# Patient Record
Sex: MALE | Race: BLACK OR AFRICAN AMERICAN | Employment: UNEMPLOYED | URBAN - METROPOLITAN AREA
[De-identification: names, ages, dates, MRNs, and addresses within clinical notes are randomized per-mention and may not be internally consistent; named-entity substitution may affect disease eponyms.]

---

## 2020-11-17 PROCEDURE — 99284 EMERGENCY DEPT VISIT MOD MDM: CPT

## 2020-11-17 PROCEDURE — 99283 EMERGENCY DEPT VISIT LOW MDM: CPT

## 2020-11-18 ENCOUNTER — HOSPITAL ENCOUNTER (OUTPATIENT)
Dept: GENERAL RADIOLOGY | Age: 50
Discharge: HOME | End: 2020-11-18
Attending: PHYSICIAN ASSISTANT

## 2020-11-18 ENCOUNTER — HOSPITAL ENCOUNTER (EMERGENCY)
Age: 50
Discharge: HOME | End: 2020-11-18
Attending: EMERGENCY MEDICINE

## 2020-11-18 VITALS
OXYGEN SATURATION: 97 % | HEART RATE: 104 BPM | SYSTOLIC BLOOD PRESSURE: 161 MMHG | RESPIRATION RATE: 18 BRPM | DIASTOLIC BLOOD PRESSURE: 98 MMHG | TEMPERATURE: 98.5 F

## 2020-11-18 DIAGNOSIS — Z20.822 SUSPECTED COVID-19 VIRUS INFECTION: Primary | ICD-10-CM

## 2020-11-18 DIAGNOSIS — R05.9 COUGH: ICD-10-CM

## 2020-11-18 LAB
APPEARANCE UR: CLEAR
BILIRUB UR QL: NEGATIVE
COLOR UR: YELLOW
GLUCOSE UR STRIP.AUTO-MCNC: NEGATIVE MG/DL
HGB UR QL STRIP: ABNORMAL
KETONES UR QL STRIP.AUTO: NEGATIVE MG/DL
LEUKOCYTE ESTERASE UR QL STRIP.AUTO: NEGATIVE
NITRITE UR QL STRIP.AUTO: NEGATIVE
PH UR STRIP: 6 [PH] (ref 5–8)
PROT UR STRIP-MCNC: NEGATIVE MG/DL
RBC #/AREA URNS HPF: NORMAL /HPF (ref 0–5)
SP GR UR REFRACTOMETRY: 1.02 (ref 1–1.03)
UROBILINOGEN UR QL STRIP.AUTO: 0.2 EU/DL (ref 0.2–1)
WBC URNS QL MICRO: NORMAL /HPF (ref 0–5)

## 2020-11-18 PROCEDURE — 81001 URINALYSIS AUTO W/SCOPE: CPT

## 2020-11-18 PROCEDURE — 87086 URINE CULTURE/COLONY COUNT: CPT

## 2020-11-18 PROCEDURE — 87635 SARS-COV-2 COVID-19 AMP PRB: CPT

## 2020-11-18 PROCEDURE — 71045 X-RAY EXAM CHEST 1 VIEW: CPT

## 2020-11-18 RX ORDER — ALBUTEROL SULFATE 90 UG/1
2 AEROSOL, METERED RESPIRATORY (INHALATION)
Qty: 1 INHALER | Refills: 0 | Status: SHIPPED | OUTPATIENT
Start: 2020-11-18 | End: 2021-04-09

## 2020-11-18 RX ORDER — IBUPROFEN 800 MG/1
800 TABLET ORAL
Qty: 20 TAB | Refills: 0 | Status: SHIPPED | OUTPATIENT
Start: 2020-11-18 | End: 2020-11-25

## 2020-11-18 RX ORDER — AZITHROMYCIN 250 MG/1
TABLET, FILM COATED ORAL
Qty: 6 TAB | Refills: 0 | Status: SHIPPED | OUTPATIENT
Start: 2020-11-18 | End: 2021-04-09

## 2020-11-18 RX ORDER — BENZONATATE 100 MG/1
100 CAPSULE ORAL
Qty: 30 CAP | Refills: 0 | Status: SHIPPED | OUTPATIENT
Start: 2020-11-18 | End: 2020-11-25

## 2020-11-18 NOTE — LETTER
NOTIFICATION OF RETURN TO WORK / SCHOOL 
 
11/18/2020 Mr. Nikhil Garcia [facility] [id number] To Whom It May Concern: Nikhil Garcia was seen in the ED on 11/18/2020 and may be excused from work for 2 weeks, unless his COVID test results negative and he is asymptomatic. Sincerely, Anika Mart PA-C

## 2020-11-18 NOTE — ED NOTES
I have reviewed discharge instructions with the patient. The patient verbalized understanding. No further questions.

## 2020-11-18 NOTE — ED PROVIDER NOTES
EMERGENCY DEPARTMENT HISTORY AND PHYSICAL EXAM    Date: 11/18/2020  Patient Name: Jennifer Cabrera    History of Presenting Illness     Chief Complaint   Patient presents with    Generalized Body Aches    Back Pain    Headache         History Provided By: patient     Chief Complaint: SOB, cough, loss of taste   Duration: 3 days  Timing:  acute  Location: chest   Quality n/a  Severity: moderate  Modifying Factors: none   Associated Symptoms: cough, headaches, body aches, back pain, SOB, loss of taste       Additional History (Context): Jennifer Cabrera is a 52 y.o. male with no documented PMH who presents with c/o 3 days of cough, SOB, body aches, back aches, headaches, and loss of taste. Denies any known COVID exposures but does express concerns as he went to a bar last week. No other complaints reported at this time. PCP: None    Current Outpatient Medications   Medication Sig Dispense Refill    ibuprofen (MOTRIN) 800 mg tablet Take 1 Tab by mouth every six (6) hours as needed for Pain for up to 7 days. 20 Tab 0    benzonatate (Tessalon Perles) 100 mg capsule Take 1 Cap by mouth three (3) times daily as needed for Cough for up to 7 days. 30 Cap 0    azithromycin (ZITHROMAX) 250 mg tablet Use as directed 6 Tab 0    albuterol (PROVENTIL HFA, VENTOLIN HFA, PROAIR HFA) 90 mcg/actuation inhaler Take 2 Puffs by inhalation every four (4) hours as needed for Wheezing. 1 Inhaler 0    methocarbamol (ROBAXIN) 500 mg tablet Take 1.5 Tabs by mouth four (4) times daily. 14 Tab 0       Past History     Past Medical History:  No past medical history on file. Past Surgical History:  No past surgical history on file. Family History:  No family history on file.     Social History:  Social History     Tobacco Use    Smoking status: Not on file   Substance Use Topics    Alcohol use: Not on file    Drug use: Not on file       Allergies:  No Known Allergies      Review of Systems   Review of Systems   Constitutional: Negative for chills and fever. Loss of taste   HENT: Negative. Negative for congestion, ear pain and rhinorrhea. Eyes: Negative. Negative for pain and redness. Respiratory: Positive for cough and shortness of breath. Negative for wheezing and stridor. Cardiovascular: Negative. Negative for chest pain and leg swelling. Gastrointestinal: Negative. Negative for abdominal pain, constipation, diarrhea, nausea and vomiting. Genitourinary: Negative. Negative for dysuria and frequency. Musculoskeletal: Positive for back pain and myalgias. Negative for neck pain. Skin: Negative. Negative for rash and wound. Neurological: Positive for headaches. Negative for dizziness, seizures and syncope. All other systems reviewed and are negative. All Other Systems Negative  Physical Exam     Vitals:    11/18/20 0001   BP: (!) 161/98   Pulse: (!) 104   Resp: 18   Temp: 98.5 °F (36.9 °C)   SpO2: 97%     Physical Exam  Vitals signs and nursing note reviewed. Constitutional:       General: He is not in acute distress. Appearance: He is well-developed. He is not diaphoretic. HENT:      Head: Normocephalic and atraumatic. Eyes:      General: No scleral icterus. Right eye: No discharge. Left eye: No discharge. Conjunctiva/sclera: Conjunctivae normal.   Neck:      Musculoskeletal: Normal range of motion and neck supple. Cardiovascular:      Rate and Rhythm: Normal rate and regular rhythm. Heart sounds: Normal heart sounds. No murmur. No friction rub. No gallop. Pulmonary:      Effort: Pulmonary effort is normal. No respiratory distress. Breath sounds: Normal breath sounds. No stridor. No wheezing, rhonchi or rales. Musculoskeletal: Normal range of motion. Skin:     General: Skin is warm and dry. Findings: No erythema or rash. Neurological:      Mental Status: He is alert and oriented to person, place, and time.       Coordination: Coordination normal. Comments: Gait is steady and patient exhibits no evidence of ataxia. Patient is able to ambulate without difficulty. No focal neurological deficit noted. No facial droop, slurred speech, or evidence of altered mentation noted on exam.     Psychiatric:         Behavior: Behavior normal.         Thought Content: Thought content normal.              Diagnostic Study Results     Labs -   No results found for this or any previous visit (from the past 12 hour(s)). Radiologic Studies -   XR CHEST PORT    (Results Pending)     CT Results  (Last 48 hours)    None        CXR Results  (Last 48 hours)    None            Medical Decision Making   I am the first provider for this patient. I reviewed the vital signs, available nursing notes, past medical history, past surgical history, family history and social history. Vital Signs-Reviewed the patient's vital signs. Records Reviewed: Anika Mart PA-C     Procedures:  Procedures    Provider Notes (Medical Decision Making): Impression:  Suspected COVID    Novel coronavirus testing sent out   Chest x-ray negative for acute process or definite infiltrates  UA ordered, pt provided a specimen but did not want to wait for the results. Will send for culture. Clinical presentation suggestive of suspected COVID, will plan to d/c with zithromax, tessalon, motrin, and albuterol with self quarantine instructions and pcp follow-up. Pt agrees. Anika Mart PA-C      MED RECONCILIATION:  No current facility-administered medications for this encounter. Current Outpatient Medications   Medication Sig    ibuprofen (MOTRIN) 800 mg tablet Take 1 Tab by mouth every six (6) hours as needed for Pain for up to 7 days.  benzonatate (Tessalon Perles) 100 mg capsule Take 1 Cap by mouth three (3) times daily as needed for Cough for up to 7 days.     azithromycin (ZITHROMAX) 250 mg tablet Use as directed    albuterol (PROVENTIL HFA, VENTOLIN HFA, PROAIR HFA) 90 mcg/actuation inhaler Take 2 Puffs by inhalation every four (4) hours as needed for Wheezing.  methocarbamol (ROBAXIN) 500 mg tablet Take 1.5 Tabs by mouth four (4) times daily. Disposition:  D/c    DISCHARGE NOTE:   Patient is stable for discharge at this time. I have discussed all the findings from today's work up with the patient, including lab results and imaging. I have answered all questions. Rx for zpack, tessalon, motrin, and albuterol given. Rest and close follow-up with the PCP recommended this week. Return to the ED immediately for any new or worsening symptoms. April Neela Jones PA-C     Follow-up Information     Follow up With Specialties Details Why [address]  [facility] [address] [facility] EMERGENCY DEPT Emergency Medicine  As needed, If symptoms worsen [address] [facility] [address]  [phone]          Current Discharge Medication List      START taking these medications    Details   ibuprofen (MOTRIN) 800 mg tablet Take 1 Tab by mouth every six (6) hours as needed for Pain for up to 7 days. Qty: 20 Tab, Refills: 0      benzonatate (Tessalon Perles) 100 mg capsule Take 1 Cap by mouth three (3) times daily as needed for Cough for up to 7 days. Qty: 30 Cap, Refills: 0      azithromycin (ZITHROMAX) 250 mg tablet Use as directed  Qty: 6 Tab, Refills: 0      albuterol (PROVENTIL HFA, VENTOLIN HFA, PROAIR HFA) 90 mcg/actuation inhaler Take 2 Puffs by inhalation every four (4) hours as needed for Wheezing. Qty: 1 Inhaler, Refills: 0                   Diagnosis     Clinical Impression:   1. Suspected COVID-19 virus infection    2.  Cough

## 2020-11-18 NOTE — DISCHARGE INSTRUCTIONS
[employer] Activation    Thank you for requesting access to [employer]. Please follow the instructions below to securely access and download your online medical record. [employer] allows you to send messages to your doctor, view your test results, renew your prescriptions, schedule appointments, and more. How Do I Sign Up? 1. In your internet browser, go to [email or web address]  2. Click on the First Time User? Click Here link in the Sign In box. You will be redirect to the New Member Sign Up page. 3. Enter your [employer] Access Code exactly as it appears below. You will not need to use this code after youve completed the sign-up process. If you do not sign up before the expiration date, you must request a new code. [employer] Access Code: Activation code not generated  Current [employer] Status: Active (This is the date your [employer] access code will )    4. Enter the last four digits of your Social Security Number (xxxx) and Date of Birth (mm/dd/yyyy) as indicated and click Submit. You will be taken to the next sign-up page. 5. Create a [employer] ID. This will be your [employer] login ID and cannot be changed, so think of one that is secure and easy to remember. 6. Create a [employer] password. You can change your password at any time. 7. Enter your Password Reset Question and Answer. This can be used at a later time if you forget your password. 8. Enter your e-mail address. You will receive e-mail notification when new information is available in [address] 9. Click Sign Up. You can now view and download portions of your medical record. 10. Click the Download Summary menu link to download a portable copy of your medical information. Additional Information    If you have questions, please visit the Frequently Asked Questions section of the [employer] website at [email or web address] Remember, [employer] is NOT to be used for urgent needs. For medical emergencies, dial 911.

## 2020-11-19 ENCOUNTER — PATIENT OUTREACH (OUTPATIENT)
Dept: CASE MANAGEMENT | Age: 50
End: 2020-11-19

## 2020-11-19 LAB
BACTERIA SPEC CULT: NORMAL
SARS-COV-2, COV2NT: NOT DETECTED
SERVICE CMNT-IMP: NORMAL

## 2020-11-19 NOTE — PROGRESS NOTES
Patient contacted regarding COVID-19 exposure. Discussed COVID-19 related testing which was pending at this time. Test results were pending. Patient informed of results, if available? n/a. Outreach made within 2 business days of discharge: Yes    Care Transition Nurse/ Ambulatory Care Manager/ LPN Care Coordinator contacted the patient by telephone to perform post discharge assessment. Verified name and  with patient as identifiers. Provided introduction to self, and explanation of the CTN/ACM/LPN role, and reason for call due to risk factors for infection and/or exposure to COVID-19. Symptoms reviewed with patient who verbalized the following symptoms: pain or aching joints, shortness of breath, loss or taste or smell and diarrhea. Due to no new or worsening symptoms encounter was not routed to provider for escalation. Discussed follow-up appointments. If no appointment was previously scheduled, appointment scheduling offered: yes  [facility] follow up appointment(s): No future appointments. Non-[facility] follow up appointment(s): n/a   Patient provided with phone number to [facility]. Advance Care Planning:   Does patient have an Advance Directive: currently not on file; education provided     Patient has following risk factors of: viral symptoms. CTN/ACM/LPN reviewed discharge instructions, medical action plan and red flags such as increased shortness of breath, increasing fever and signs of decompensation with patient who verbalized understanding. Discussed exposure protocols and quarantine with CDC Guidelines What to do if you are sick with coronavirus disease 2019.  Patient was given an opportunity for questions and concerns. The patient agrees to contact the [facility] exposure line [phone], local [facility] department [address]  ([phone]) and PCP office for questions related to their healthcare. CTN/ACM provided contact information for future needs.     Reviewed and educated patient on any new and changed medications related to discharge diagnosis. Patient/family/caregiver given information for [employer] and agrees to enroll yes  Patient's preferred e-mail:  [email or web address]  Patient's preferred phone number: [phone]  Based on Loop alert triggers, patient will be contacted by nurse care manager for worsening symptoms. Pt will be further monitored by COVID Loop Team based on severity of symptoms and risk factors.

## 2021-04-06 ENCOUNTER — APPOINTMENT (OUTPATIENT)
Dept: GENERAL RADIOLOGY | Age: 51
DRG: 137 | End: 2021-04-06
Attending: NURSE PRACTITIONER
Payer: MEDICAID

## 2021-04-06 ENCOUNTER — HOSPITAL ENCOUNTER (INPATIENT)
Age: 51
LOS: 3 days | Discharge: HOME HEALTH | DRG: 137 | End: 2021-04-09
Attending: EMERGENCY MEDICINE | Admitting: FAMILY MEDICINE
Payer: MEDICAID

## 2021-04-06 ENCOUNTER — APPOINTMENT (OUTPATIENT)
Dept: CT IMAGING | Age: 51
DRG: 137 | End: 2021-04-06
Attending: NURSE PRACTITIONER
Payer: MEDICAID

## 2021-04-06 DIAGNOSIS — R09.02 HYPOXIA: ICD-10-CM

## 2021-04-06 DIAGNOSIS — J12.82 PNEUMONIA DUE TO COVID-19 VIRUS: Primary | ICD-10-CM

## 2021-04-06 DIAGNOSIS — U07.1 PNEUMONIA DUE TO COVID-19 VIRUS: Primary | ICD-10-CM

## 2021-04-06 DIAGNOSIS — R50.9 FEVER, UNSPECIFIED FEVER CAUSE: ICD-10-CM

## 2021-04-06 LAB
ALBUMIN SERPL-MCNC: 3.2 G/DL (ref 3.4–5)
ALBUMIN/GLOB SERPL: 0.6 {RATIO} (ref 0.8–1.7)
ALP SERPL-CCNC: 65 U/L (ref 45–117)
ALT SERPL-CCNC: 70 U/L (ref 16–61)
ANION GAP SERPL CALC-SCNC: 7 MMOL/L (ref 3–18)
AST SERPL-CCNC: 93 U/L (ref 10–38)
BASOPHILS # BLD: 0 K/UL (ref 0–0.1)
BASOPHILS NFR BLD: 0 % (ref 0–2)
BILIRUB SERPL-MCNC: 1 MG/DL (ref 0.2–1)
BUN SERPL-MCNC: 18 MG/DL (ref 7–18)
BUN/CREAT SERPL: 15 (ref 12–20)
CALCIUM SERPL-MCNC: 8.8 MG/DL (ref 8.5–10.1)
CHLORIDE SERPL-SCNC: 96 MMOL/L (ref 100–111)
CO2 SERPL-SCNC: 29 MMOL/L (ref 21–32)
CREAT SERPL-MCNC: 1.24 MG/DL (ref 0.6–1.3)
CRP SERPL-MCNC: 18.7 MG/DL (ref 0–0.3)
D DIMER PPP FEU-MCNC: 3.18 UG/ML(FEU)
DIFFERENTIAL METHOD BLD: ABNORMAL
EOSINOPHIL # BLD: 0 K/UL (ref 0–0.4)
EOSINOPHIL NFR BLD: 0 % (ref 0–5)
ERYTHROCYTE [DISTWIDTH] IN BLOOD BY AUTOMATED COUNT: 12.9 % (ref 11.6–14.5)
GLOBULIN SER CALC-MCNC: 5.4 G/DL (ref 2–4)
GLUCOSE BLD STRIP.AUTO-MCNC: 187 MG/DL (ref 70–110)
GLUCOSE SERPL-MCNC: 103 MG/DL (ref 74–99)
HCT VFR BLD AUTO: 44.7 % (ref 36–48)
HGB BLD-MCNC: 16 G/DL (ref 13–16)
LACTATE BLD-SCNC: 1.86 MMOL/L (ref 0.4–2)
LYMPHOCYTES # BLD: 1 K/UL (ref 0.9–3.6)
LYMPHOCYTES NFR BLD: 17 % (ref 21–52)
MAGNESIUM SERPL-MCNC: 2.6 MG/DL (ref 1.6–2.6)
MCH RBC QN AUTO: 31.4 PG (ref 24–34)
MCHC RBC AUTO-ENTMCNC: 35.8 G/DL (ref 31–37)
MCV RBC AUTO: 87.6 FL (ref 74–97)
MONOCYTES # BLD: 0.4 K/UL (ref 0.05–1.2)
MONOCYTES NFR BLD: 7 % (ref 3–10)
NEUTS SEG # BLD: 4.5 K/UL (ref 1.8–8)
NEUTS SEG NFR BLD: 76 % (ref 40–73)
PLATELET # BLD AUTO: 246 K/UL (ref 135–420)
PLATELET COMMENTS,PCOM: ABNORMAL
PMV BLD AUTO: 9.1 FL (ref 9.2–11.8)
POTASSIUM SERPL-SCNC: 4.2 MMOL/L (ref 3.5–5.5)
PROCALCITONIN SERPL-MCNC: 0.06 NG/ML
PROT SERPL-MCNC: 8.6 G/DL (ref 6.4–8.2)
RBC # BLD AUTO: 5.1 M/UL (ref 4.35–5.65)
RBC MORPH BLD: ABNORMAL
SODIUM SERPL-SCNC: 132 MMOL/L (ref 136–145)
TROPONIN I SERPL-MCNC: <0.02 NG/ML (ref 0–0.04)
WBC # BLD AUTO: 5.9 K/UL (ref 4.6–13.2)

## 2021-04-06 PROCEDURE — 85025 COMPLETE CBC W/AUTO DIFF WBC: CPT

## 2021-04-06 PROCEDURE — 82962 GLUCOSE BLOOD TEST: CPT

## 2021-04-06 PROCEDURE — 87449 NOS EACH ORGANISM AG IA: CPT

## 2021-04-06 PROCEDURE — 74011000636 HC RX REV CODE- 636: Performed by: EMERGENCY MEDICINE

## 2021-04-06 PROCEDURE — 65270000029 HC RM PRIVATE

## 2021-04-06 PROCEDURE — APPSS60 APP SPLIT SHARED TIME 46-60 MINUTES: Performed by: NURSE PRACTITIONER

## 2021-04-06 PROCEDURE — 74011250636 HC RX REV CODE- 250/636: Performed by: NURSE PRACTITIONER

## 2021-04-06 PROCEDURE — 93005 ELECTROCARDIOGRAM TRACING: CPT

## 2021-04-06 PROCEDURE — 83605 ASSAY OF LACTIC ACID: CPT

## 2021-04-06 PROCEDURE — 84484 ASSAY OF TROPONIN QUANT: CPT

## 2021-04-06 PROCEDURE — 96375 TX/PRO/DX INJ NEW DRUG ADDON: CPT

## 2021-04-06 PROCEDURE — 84145 PROCALCITONIN (PCT): CPT

## 2021-04-06 PROCEDURE — 74011250637 HC RX REV CODE- 250/637: Performed by: NURSE PRACTITIONER

## 2021-04-06 PROCEDURE — 74011636637 HC RX REV CODE- 636/637: Performed by: NURSE PRACTITIONER

## 2021-04-06 PROCEDURE — 80053 COMPREHEN METABOLIC PANEL: CPT

## 2021-04-06 PROCEDURE — 74011000250 HC RX REV CODE- 250: Performed by: NURSE PRACTITIONER

## 2021-04-06 PROCEDURE — 99223 1ST HOSP IP/OBS HIGH 75: CPT | Performed by: NURSE PRACTITIONER

## 2021-04-06 PROCEDURE — 71045 X-RAY EXAM CHEST 1 VIEW: CPT

## 2021-04-06 PROCEDURE — 96374 THER/PROPH/DIAG INJ IV PUSH: CPT

## 2021-04-06 PROCEDURE — 86140 C-REACTIVE PROTEIN: CPT

## 2021-04-06 PROCEDURE — 71275 CT ANGIOGRAPHY CHEST: CPT

## 2021-04-06 PROCEDURE — 87040 BLOOD CULTURE FOR BACTERIA: CPT

## 2021-04-06 PROCEDURE — 85379 FIBRIN DEGRADATION QUANT: CPT

## 2021-04-06 PROCEDURE — 83735 ASSAY OF MAGNESIUM: CPT

## 2021-04-06 PROCEDURE — 99284 EMERGENCY DEPT VISIT MOD MDM: CPT

## 2021-04-06 RX ORDER — DEXAMETHASONE SODIUM PHOSPHATE 4 MG/ML
6 INJECTION, SOLUTION INTRA-ARTICULAR; INTRALESIONAL; INTRAMUSCULAR; INTRAVENOUS; SOFT TISSUE EVERY 24 HOURS
Status: DISCONTINUED | OUTPATIENT
Start: 2021-04-07 | End: 2021-04-07

## 2021-04-06 RX ORDER — SODIUM CHLORIDE 0.9 % (FLUSH) 0.9 %
5-40 SYRINGE (ML) INJECTION AS NEEDED
Status: DISCONTINUED | OUTPATIENT
Start: 2021-04-06 | End: 2021-04-09 | Stop reason: HOSPADM

## 2021-04-06 RX ORDER — GUAIFENESIN/DEXTROMETHORPHAN 100-10MG/5
5 SYRUP ORAL
Status: DISCONTINUED | OUTPATIENT
Start: 2021-04-06 | End: 2021-04-09 | Stop reason: HOSPADM

## 2021-04-06 RX ORDER — IPRATROPIUM BROMIDE AND ALBUTEROL SULFATE 2.5; .5 MG/3ML; MG/3ML
3 SOLUTION RESPIRATORY (INHALATION)
Status: DISCONTINUED | OUTPATIENT
Start: 2021-04-07 | End: 2021-04-07

## 2021-04-06 RX ORDER — POLYETHYLENE GLYCOL 3350 17 G/17G
17 POWDER, FOR SOLUTION ORAL DAILY PRN
Status: DISCONTINUED | OUTPATIENT
Start: 2021-04-06 | End: 2021-04-09 | Stop reason: HOSPADM

## 2021-04-06 RX ORDER — SODIUM CHLORIDE 0.9 % (FLUSH) 0.9 %
5-40 SYRINGE (ML) INJECTION EVERY 8 HOURS
Status: DISCONTINUED | OUTPATIENT
Start: 2021-04-06 | End: 2021-04-09 | Stop reason: HOSPADM

## 2021-04-06 RX ORDER — ENOXAPARIN SODIUM 100 MG/ML
40 INJECTION SUBCUTANEOUS EVERY 24 HOURS
Status: DISCONTINUED | OUTPATIENT
Start: 2021-04-06 | End: 2021-04-08

## 2021-04-06 RX ORDER — SODIUM CHLORIDE 0.9 % (FLUSH) 0.9 %
5-10 SYRINGE (ML) INJECTION AS NEEDED
Status: DISCONTINUED | OUTPATIENT
Start: 2021-04-06 | End: 2021-04-09 | Stop reason: HOSPADM

## 2021-04-06 RX ORDER — ONDANSETRON 2 MG/ML
4 INJECTION INTRAMUSCULAR; INTRAVENOUS
Status: DISCONTINUED | OUTPATIENT
Start: 2021-04-06 | End: 2021-04-09 | Stop reason: HOSPADM

## 2021-04-06 RX ORDER — ZINC SULFATE 50(220)MG
1 CAPSULE ORAL DAILY
Status: DISCONTINUED | OUTPATIENT
Start: 2021-04-07 | End: 2021-04-09 | Stop reason: HOSPADM

## 2021-04-06 RX ORDER — BENZONATATE 100 MG/1
100 CAPSULE ORAL
COMMUNITY
End: 2021-04-09

## 2021-04-06 RX ORDER — ASCORBIC ACID 250 MG
500 TABLET ORAL 2 TIMES DAILY
Status: DISCONTINUED | OUTPATIENT
Start: 2021-04-06 | End: 2021-04-09 | Stop reason: HOSPADM

## 2021-04-06 RX ORDER — PROMETHAZINE HYDROCHLORIDE 12.5 MG/1
12.5 TABLET ORAL
Status: DISCONTINUED | OUTPATIENT
Start: 2021-04-06 | End: 2021-04-09 | Stop reason: HOSPADM

## 2021-04-06 RX ORDER — ACETAMINOPHEN 650 MG/1
650 SUPPOSITORY RECTAL
Status: DISCONTINUED | OUTPATIENT
Start: 2021-04-06 | End: 2021-04-09 | Stop reason: HOSPADM

## 2021-04-06 RX ORDER — ACETAMINOPHEN 325 MG/1
650 TABLET ORAL
Status: DISCONTINUED | OUTPATIENT
Start: 2021-04-06 | End: 2021-04-09 | Stop reason: HOSPADM

## 2021-04-06 RX ORDER — ACETAMINOPHEN 500 MG
1000 TABLET ORAL
Status: COMPLETED | OUTPATIENT
Start: 2021-04-06 | End: 2021-04-06

## 2021-04-06 RX ORDER — DEXTROSE 50 % IN WATER (D50W) INTRAVENOUS SYRINGE
25-50 AS NEEDED
Status: DISCONTINUED | OUTPATIENT
Start: 2021-04-06 | End: 2021-04-09 | Stop reason: HOSPADM

## 2021-04-06 RX ORDER — MAGNESIUM SULFATE 100 %
4 CRYSTALS MISCELLANEOUS AS NEEDED
Status: DISCONTINUED | OUTPATIENT
Start: 2021-04-06 | End: 2021-04-09 | Stop reason: HOSPADM

## 2021-04-06 RX ORDER — DEXAMETHASONE 4 MG/1
6 TABLET ORAL
Status: COMPLETED | OUTPATIENT
Start: 2021-04-06 | End: 2021-04-06

## 2021-04-06 RX ORDER — INSULIN LISPRO 100 [IU]/ML
INJECTION, SOLUTION INTRAVENOUS; SUBCUTANEOUS
Status: DISCONTINUED | OUTPATIENT
Start: 2021-04-06 | End: 2021-04-09 | Stop reason: HOSPADM

## 2021-04-06 RX ORDER — GUAIFENESIN 600 MG/1
600 TABLET, EXTENDED RELEASE ORAL EVERY 12 HOURS
Status: DISCONTINUED | OUTPATIENT
Start: 2021-04-06 | End: 2021-04-09 | Stop reason: HOSPADM

## 2021-04-06 RX ADMIN — SODIUM CHLORIDE 1000 ML: 900 INJECTION, SOLUTION INTRAVENOUS at 17:39

## 2021-04-06 RX ADMIN — ACETAMINOPHEN 1000 MG: 500 TABLET ORAL at 16:14

## 2021-04-06 RX ADMIN — ENOXAPARIN SODIUM 40 MG: 40 INJECTION SUBCUTANEOUS at 23:03

## 2021-04-06 RX ADMIN — IOPAMIDOL 100 ML: 755 INJECTION, SOLUTION INTRAVENOUS at 19:14

## 2021-04-06 RX ADMIN — Medication 500 MG: at 22:49

## 2021-04-06 RX ADMIN — AZITHROMYCIN MONOHYDRATE 500 MG: 500 INJECTION, POWDER, LYOPHILIZED, FOR SOLUTION INTRAVENOUS at 16:14

## 2021-04-06 RX ADMIN — DEXAMETHASONE 6 MG: 4 TABLET ORAL at 17:10

## 2021-04-06 RX ADMIN — GUAIFENESIN 600 MG: 600 TABLET, EXTENDED RELEASE ORAL at 22:49

## 2021-04-06 RX ADMIN — WATER 2 G: 1 INJECTION INTRAMUSCULAR; INTRAVENOUS; SUBCUTANEOUS at 16:14

## 2021-04-06 RX ADMIN — INSULIN LISPRO 2 UNITS: 100 INJECTION, SOLUTION INTRAVENOUS; SUBCUTANEOUS at 22:00

## 2021-04-06 NOTE — ED PROVIDER NOTES
EMERGENCY DEPARTMENT HISTORY AND PHYSICAL EXAM    Date: 4/6/2021  Patient Name: Yeni Lozano    History of Presenting Illness     Chief Complaint   Patient presents with    Positive For Covid-19    Shortness of Breath       History Provided By: Patient    Additional History (Context): Yeni Lozano is a 48 y.o. male with past medical history significant for hypertension, asthma, and tobacco abuse presents the ER with complaints of shortness of breath, headache, and recently tested positive for COVID-19 1 week ago. He was seen at another ER and diagnosed with Covid and treated with doxycycline for possible developing Covid pneumonia. He has been taking his antibiotics as prescribed along with Renate Renee and feels he is not improving. He is also using his inhaler more often than usual.  Not taking anything for fever. Denies abdominal pain, nausea, vomiting, diarrhea, visual changes, photophobia, phonophobia, dizziness. He was exposed by a close contact, his daughter. The patient is not a healthcare worker. No immunocompromising conditions such as HIV, cancer, diabetes, transplant, alcoholism, kidney failure, autoimmune disease, taking immunosuppressive medications, or congenital disorder.        PCP: None    Current Facility-Administered Medications   Medication Dose Route Frequency Provider Last Rate Last Admin    sodium chloride (NS) flush 5-10 mL  5-10 mL IntraVENous PRN Harman Singh FNP        cefTRIAXone (ROCEPHIN) 2 g in sterile water (preservative free) 20 mL IV syringe  2 g IntraVENous Q24H Beulah Singh FNP        azithromycin (ZITHROMAX) 500 mg in 0.9% sodium chloride 250 mL (VIAL-MATE)  500 mg IntraVENous Q24H TIFFANIE Pérez   500 mg at 04/06/21 1614    dexAMETHasone (DECADRON) tablet 6 mg  6 mg Oral NOW TIFFANIE Pérez        sodium chloride 0.9 % bolus infusion 1,000 mL  1,000 mL IntraVENous ONCE TIFFANIE Garcia         Current Outpatient Medications   Medication Sig Dispense Refill    azithromycin (ZITHROMAX) 250 mg tablet Use as directed 6 Tab 0    albuterol (PROVENTIL HFA, VENTOLIN HFA, PROAIR HFA) 90 mcg/actuation inhaler Take 2 Puffs by inhalation every four (4) hours as needed for Wheezing. 1 Inhaler 0    methocarbamol (ROBAXIN) 500 mg tablet Take 1.5 Tabs by mouth four (4) times daily. 14 Tab 0       Past History     Past Medical History:  No past medical history on file. Past Surgical History:  No past surgical history on file. Family History:  No family history on file. Social History:  Social History     Tobacco Use    Smoking status: Not on file   Substance Use Topics    Alcohol use: Not on file    Drug use: Not on file       Allergies:  No Known Allergies      Review of Systems     Review of Systems   Constitutional: Negative for chills and fever. HENT: Negative for nasal congestion, sore throat, rhinorrhea  Eyes: Negative. Respiratory: Positive for cough and shortness of breath. Cardiovascular: Negative for chest pain and palpitations. Gastrointestinal: Negative for abdominal pain, constipation, diarrhea, nausea and vomiting. Genitourinary: Negative. Negative for difficulty urinating and flank pain. Musculoskeletal: Negative for back pain. Negative for gait problem and neck pain. Skin: Negative. Allergic/Immunologic: Negative. Neurological: Negative for dizziness, weakness, numbness and headaches. Psychiatric/Behavioral: Negative. All other systems reviewed and are negative. All Other Systems Negative  Physical Exam     Vitals:    04/06/21 1608 04/06/21 1739   BP: (!) 133/97    Pulse: (!) 117 (!) 110   Resp: 22 21   Temp: (!) 102.1 °F (38.9 °C) (!) 102 °F (38.9 °C)   SpO2: 93% 92%     Physical Exam  Vitals signs and nursing note reviewed. Constitutional:       General: He is not in acute distress. Appearance: He is well-developed. He is obese.  He is not ill-appearing, toxic-appearing or diaphoretic. HENT:      Head: Normocephalic and atraumatic. Nose: Rhinorrhea present. No congestion. Mouth/Throat:      Mouth: Mucous membranes are moist.      Pharynx: Oropharynx is clear. No oropharyngeal exudate or posterior oropharyngeal erythema. Eyes:      General: No scleral icterus. Conjunctiva/sclera: Conjunctivae normal.      Pupils: Pupils are equal, round, and reactive to light. Neck:      Musculoskeletal: Normal range of motion and neck supple. No muscular tenderness. Cardiovascular:      Rate and Rhythm: Regular rhythm. Tachycardia present. Pulses: Normal pulses. Heart sounds: Normal heart sounds. No murmur. No friction rub. No gallop. No S3 sounds. Pulmonary:      Effort: Pulmonary effort is normal. No tachypnea, accessory muscle usage or respiratory distress. Breath sounds: Normal breath sounds. No stridor. No wheezing, rhonchi or rales. Chest:      Chest wall: No tenderness. Abdominal:      General: Abdomen is flat. Bowel sounds are normal. There is no distension. Palpations: Abdomen is soft. There is no mass or pulsatile mass. Tenderness: There is no abdominal tenderness. There is no right CVA tenderness, left CVA tenderness, guarding or rebound. Hernia: No hernia is present. Musculoskeletal: Normal range of motion. Right lower leg: He exhibits no tenderness. No edema. Left lower leg: He exhibits no tenderness. No edema. Lymphadenopathy:      Cervical: No cervical adenopathy. Skin:     General: Skin is warm and dry. Capillary Refill: Capillary refill takes less than 2 seconds. Findings: No rash. Neurological:      General: No focal deficit present. Mental Status: He is alert and oriented to person, place, and time. Gait: Gait is intact.    Psychiatric:         Mood and Affect: Mood normal.         Behavior: Behavior normal.           Diagnostic Study Results     Labs -     Recent Results (from the past 12 hour(s))   METABOLIC PANEL, COMPREHENSIVE    Collection Time: 04/06/21  4:15 PM   Result Value Ref Range    Sodium 132 (L) 136 - 145 mmol/L    Potassium 4.2 3.5 - 5.5 mmol/L    Chloride 96 (L) 100 - 111 mmol/L    CO2 29 21 - 32 mmol/L    Anion gap 7 3.0 - 18 mmol/L    Glucose 103 (H) 74 - 99 mg/dL    BUN 18 7.0 - 18 MG/DL    Creatinine 1.24 0.6 - 1.3 MG/DL    BUN/Creatinine ratio 15 12 - 20      GFR est AA >60 >60 ml/min/1.73m2    GFR est non-AA >60 >60 ml/min/1.73m2    Calcium 8.8 8.5 - 10.1 MG/DL    Bilirubin, total 1.0 0.2 - 1.0 MG/DL    ALT (SGPT) 70 (H) 16 - 61 U/L    AST (SGOT) 93 (H) 10 - 38 U/L    Alk. phosphatase 65 45 - 117 U/L    Protein, total 8.6 (H) 6.4 - 8.2 g/dL    Albumin 3.2 (L) 3.4 - 5.0 g/dL    Globulin 5.4 (H) 2.0 - 4.0 g/dL    A-G Ratio 0.6 (L) 0.8 - 1.7     CBC WITH AUTOMATED DIFF    Collection Time: 04/06/21  4:15 PM   Result Value Ref Range    WBC 5.9 4.6 - 13.2 K/uL    RBC 5.10 4.35 - 5.65 M/uL    HGB 16.0 13.0 - 16.0 g/dL    HCT 44.7 36.0 - 48.0 %    MCV 87.6 74.0 - 97.0 FL    MCH 31.4 24.0 - 34.0 PG    MCHC 35.8 31.0 - 37.0 g/dL    RDW 12.9 11.6 - 14.5 %    PLATELET 497 705 - 268 K/uL    MPV 9.1 (L) 9.2 - 11.8 FL    NEUTROPHILS 76 (H) 40 - 73 %    LYMPHOCYTES 17 (L) 21 - 52 %    MONOCYTES 7 3 - 10 %    EOSINOPHILS 0 0 - 5 %    BASOPHILS 0 0 - 2 %    ABS. NEUTROPHILS 4.5 1.8 - 8.0 K/UL    ABS. LYMPHOCYTES 1.0 0.9 - 3.6 K/UL    ABS. MONOCYTES 0.4 0.05 - 1.2 K/UL    ABS. EOSINOPHILS 0.0 0.0 - 0.4 K/UL    ABS.  BASOPHILS 0.0 0.0 - 0.1 K/UL    DF MANUAL      PLATELET COMMENTS ADEQUATE PLATELETS      RBC COMMENTS NORMOCYTIC, NORMOCHROMIC     D DIMER    Collection Time: 04/06/21  4:15 PM   Result Value Ref Range    D DIMER 3.18 (H) <0.46 ug/ml(FEU)   TROPONIN I    Collection Time: 04/06/21  4:15 PM   Result Value Ref Range    Troponin-I, QT <0.02 0.0 - 0.045 NG/ML   POC LACTIC ACID    Collection Time: 04/06/21  4:21 PM   Result Value Ref Range    Lactic Acid (POC) 1.86 0.40 - 2.00 mmol/L Radiologic Studies -   XR CHEST PORT    (Results Pending)   CTA CHEST W OR W WO CONT    (Results Pending)     CT Results  (Last 48 hours)    None        CXR Results  (Last 48 hours)    None            Medical Decision Making   I am the first provider for this patient. I reviewed the vital signs, available nursing notes, past medical history, past surgical history, family history and social history. Vital Signs-Reviewed the patient's vital signs. Pulse Oximetry Analysis -93-94% on room air    Records Reviewed: Nursing notes, old medical records and any previous labs, imaging, visits, consultations pertinent to patient care    Procedures:  Procedures    PPE worn during exam: Gloves, eye protection, and surgical mask    ED Course: Progress Notes, Reevaluation, and Consults:  4:30 PM  Initial assessment performed. The patients presenting problems have been discussed, and they/their family are in agreement with the care plan formulated and outlined with them. I have encouraged them to ask questions as they arise throughout their visit. 4:36 PM sepsis bundle initiated for sirs criteria in triage. IV fluids withheld at this time due to positive COVID-19. Lactic acid within normal limits 1.86    4:45 PM chest x-ray shows significant patchy infiltrates bilaterally consistent with Covid pneumonia. Patient was started on empiric antibiotics for CAP in triage with Rocephin and azithromycin due to meeting SIRS criteria.    5:08 PM consult with ID, Dr. Jaye Caceres patient is not a candidate for remdesivir or p.m. due to greater than 5 days since diagnosis for Covid. She does recommend Decadron. 5:15 PM CBC shows no leukocytosis or anemia. D-dimer significantly elevated at 3.18CTA of the chest ordered to rule out PE.  CMP shows a sodium of 132, chloride of 96, glucose of 103 and mild transaminitis with ALT of 70 and AST of 93. Troponin is undetectable. Blood cultures x2 are pending.     5:59 PM : Pt care transferred to Jorge L Grewal PA-ED provider. History of patient complaint(s), available diagnostic reports and current treatment plan has been discussed thoroughly. Bedside rounding on patient occured : no . Intended disposition of patient : Admit for hypoxia, Covid pneumonia and failed outpatient treatment. Pending diagnostics reports and/or labs (please list): CTA chestrule out PE    Provider Notes (Medical Decision Making):     Differential diagnosis: Differential diagnosis: Pneumonia, sequela of COVID-19, pulmonary embolism, MI, sepsis, reactive airway/asthma exacerbation    59-year-old male presents to the ER with complaints of shortness of breath and positive Covid. He is tachycardic and febrile. Positive for SIRS criteria. No hypotension. His oxygen saturations are 93 to 94% on room air with slight tachypnea with respiratory rate of 22. He was treated with doxycycline for possible evolving Covid pneumonia 1 week ago and has been compliant with this medication regimen. He is a smoker and has history of asthma. Lungs are clear to auscultation bilaterally with no retraction,stridor, rhonchi, or wheezing. Due to respiratory complaints, a portable chest x-ray was ordered. Speaking in full, clear sentences without staccato speech. No further evidence of septic shock, therefore 30mL/kg of IV fluids were withheld.     MED RECONCILIATION:  Current Facility-Administered Medications   Medication Dose Route Frequency    sodium chloride (NS) flush 5-10 mL  5-10 mL IntraVENous PRN    cefTRIAXone (ROCEPHIN) 2 g in sterile water (preservative free) 20 mL IV syringe  2 g IntraVENous Q24H    azithromycin (ZITHROMAX) 500 mg in 0.9% sodium chloride 250 mL (VIAL-MATE)  500 mg IntraVENous Q24H    dexAMETHasone (DECADRON) tablet 6 mg  6 mg Oral NOW    sodium chloride 0.9 % bolus infusion 1,000 mL  1,000 mL IntraVENous ONCE     Current Outpatient Medications   Medication Sig    azithromycin (ZITHROMAX) 250 mg tablet Use as directed    albuterol (PROVENTIL HFA, VENTOLIN HFA, PROAIR HFA) 90 mcg/actuation inhaler Take 2 Puffs by inhalation every four (4) hours as needed for Wheezing.  methocarbamol (ROBAXIN) 500 mg tablet Take 1.5 Tabs by mouth four (4) times daily. Disposition:  Pending        Current Discharge Medication List              Diagnosis     Clinical Impression: No diagnosis found. Dictation disclaimer:  Please note that this dictation was completed with [employer], the [employer] voice recognition software. Quite often unanticipated grammatical, syntax, homophones, and other interpretive errors are inadvertently transcribed by the computer software. Please disregard these errors. Please excuse any errors that have escaped final proofreading.

## 2021-04-06 NOTE — Clinical Note
Status[de-identified] INPATIENT [101]   Type of Bed: Telemetry [19]   Inpatient Hospitalization Certified Necessary for the Following Reasons: 3.  Patient receiving treatment that can only be provided in an inpatient setting (further clarification in H&P documentation)   Admitting Diagnosis: COVID-19 [[id number]]   Admitting Diagnosis: Hypoxia [255743]   Admitting Diagnosis: Fever [[id number]]   Admitting Physician: Jennifer Pierce [[id number]]   Attending Physician: Jennifer Pierce [[id number]]   Estimated Length of Stay: 2 Midnights   Discharge Plan[de-identified] Home with Office Follow-up

## 2021-04-06 NOTE — ED NOTES
6:10 PM :Pt care assumed from Karis Mathew, NP, ED provider. Pt complaint(s), current treatment plan, progression and available diagnostic results have been discussed thoroughly. Rounding occurred: no  Intended Disposition: ADMIT   Pending diagnostic reports and/or labs (please list): CTA chest, admission for COVID pneumonia, hypoxia     Karis Mathew spoke with ID. No BAM. Decadron BID, admit. COVID + 3/30 at [facility], 96% on room air at that time. 8:20 PM: Pt 90% on room air sitting on bed. No distress. With minimal ambulation, pt reduces to 88% on room air. Discussed admission with patient, in agreement. Discussed with nurse, will place patient on 3L nasal cannula. CTA chest :  1. No evidence of pulmonary embolism. 2. Extensive groundglass lung infiltrates, typical in appearance for Covid-19  pneumonia. 3. Partially imaged left adrenal nodule. This can be assessed with nonemergent  CT abdomen adrenal protocol with and without IV contrast.    CONSULT NOTE:   8:27 PM  I spoke with Dr. Rosette Kussmaul  Specialty: Hospitalist  Discussed pt's hx, disposition, and available diagnostic and imaging results. Reviewed care plans. Consulting physician agrees with plans as outlined. Accepts admission.    Written by Edilberto Pryor PA-C

## 2021-04-07 LAB
ALBUMIN SERPL-MCNC: 2.8 G/DL (ref 3.4–5)
ALBUMIN/GLOB SERPL: 0.7 {RATIO} (ref 0.8–1.7)
ALP SERPL-CCNC: 59 U/L (ref 45–117)
ALT SERPL-CCNC: 66 U/L (ref 16–61)
ANION GAP SERPL CALC-SCNC: 8 MMOL/L (ref 3–18)
APTT PPP: 36.4 SEC (ref 23–36.4)
AST SERPL-CCNC: 76 U/L (ref 10–38)
BASOPHILS # BLD: 0 K/UL (ref 0–0.1)
BASOPHILS NFR BLD: 0 % (ref 0–2)
BILIRUB SERPL-MCNC: 0.6 MG/DL (ref 0.2–1)
BUN SERPL-MCNC: 18 MG/DL (ref 7–18)
BUN/CREAT SERPL: 18 (ref 12–20)
CALCIUM SERPL-MCNC: 7.9 MG/DL (ref 8.5–10.1)
CHLORIDE SERPL-SCNC: 100 MMOL/L (ref 100–111)
CO2 SERPL-SCNC: 25 MMOL/L (ref 21–32)
CREAT SERPL-MCNC: 1.02 MG/DL (ref 0.6–1.3)
CRP SERPL-MCNC: 16.5 MG/DL (ref 0–0.3)
D DIMER PPP FEU-MCNC: 2.83 UG/ML(FEU)
DIFFERENTIAL METHOD BLD: ABNORMAL
EOSINOPHIL # BLD: 0 K/UL (ref 0–0.4)
EOSINOPHIL NFR BLD: 0 % (ref 0–5)
ERYTHROCYTE [DISTWIDTH] IN BLOOD BY AUTOMATED COUNT: 12.6 % (ref 11.6–14.5)
FERRITIN SERPL-MCNC: 1892 NG/ML (ref 8–388)
FIBRINOGEN PPP-MCNC: 693 MG/DL (ref 210–451)
GLOBULIN SER CALC-MCNC: 4.2 G/DL (ref 2–4)
GLUCOSE BLD STRIP.AUTO-MCNC: 108 MG/DL (ref 70–110)
GLUCOSE BLD STRIP.AUTO-MCNC: 132 MG/DL (ref 70–110)
GLUCOSE BLD STRIP.AUTO-MCNC: 146 MG/DL (ref 70–110)
GLUCOSE BLD STRIP.AUTO-MCNC: 165 MG/DL (ref 70–110)
GLUCOSE SERPL-MCNC: 118 MG/DL (ref 74–99)
HCT VFR BLD AUTO: 39.9 % (ref 36–48)
HGB BLD-MCNC: 14.3 G/DL (ref 13–16)
INR PPP: 1 (ref 0.8–1.2)
L PNEUMO AG UR QL IA: NEGATIVE
LDH SERPL L TO P-CCNC: 553 U/L (ref 81–234)
LYMPHOCYTES # BLD: 0.6 K/UL (ref 0.9–3.6)
LYMPHOCYTES NFR BLD: 17 % (ref 21–52)
MCH RBC QN AUTO: 31.3 PG (ref 24–34)
MCHC RBC AUTO-ENTMCNC: 35.8 G/DL (ref 31–37)
MCV RBC AUTO: 87.3 FL (ref 74–97)
MONOCYTES # BLD: 0.2 K/UL (ref 0.05–1.2)
MONOCYTES NFR BLD: 6 % (ref 3–10)
NEUTS SEG # BLD: 2.8 K/UL (ref 1.8–8)
NEUTS SEG NFR BLD: 77 % (ref 40–73)
PLATELET # BLD AUTO: 231 K/UL (ref 135–420)
PLATELET COMMENTS,PCOM: ABNORMAL
PMV BLD AUTO: 9.6 FL (ref 9.2–11.8)
POTASSIUM SERPL-SCNC: 4.9 MMOL/L (ref 3.5–5.5)
PROCALCITONIN SERPL-MCNC: <0.05 NG/ML
PROT SERPL-MCNC: 7 G/DL (ref 6.4–8.2)
PROTHROMBIN TIME: 13.5 SEC (ref 11.5–15.2)
RBC # BLD AUTO: 4.57 M/UL (ref 4.35–5.65)
RBC MORPH BLD: ABNORMAL
S PNEUM AG UR QL: NEGATIVE
SODIUM SERPL-SCNC: 133 MMOL/L (ref 136–145)
WBC # BLD AUTO: 3.6 K/UL (ref 4.6–13.2)

## 2021-04-07 PROCEDURE — 74011250636 HC RX REV CODE- 250/636: Performed by: INTERNAL MEDICINE

## 2021-04-07 PROCEDURE — 82728 ASSAY OF FERRITIN: CPT

## 2021-04-07 PROCEDURE — 85379 FIBRIN DEGRADATION QUANT: CPT

## 2021-04-07 PROCEDURE — 74011250637 HC RX REV CODE- 250/637: Performed by: INTERNAL MEDICINE

## 2021-04-07 PROCEDURE — 97161 PT EVAL LOW COMPLEX 20 MIN: CPT

## 2021-04-07 PROCEDURE — 94640 AIRWAY INHALATION TREATMENT: CPT

## 2021-04-07 PROCEDURE — 74011250636 HC RX REV CODE- 250/636: Performed by: NURSE PRACTITIONER

## 2021-04-07 PROCEDURE — 80053 COMPREHEN METABOLIC PANEL: CPT

## 2021-04-07 PROCEDURE — 83615 LACTATE (LD) (LDH) ENZYME: CPT

## 2021-04-07 PROCEDURE — 77010033678 HC OXYGEN DAILY

## 2021-04-07 PROCEDURE — 65660000000 HC RM CCU STEPDOWN

## 2021-04-07 PROCEDURE — 85384 FIBRINOGEN ACTIVITY: CPT

## 2021-04-07 PROCEDURE — 74011250637 HC RX REV CODE- 250/637: Performed by: NURSE PRACTITIONER

## 2021-04-07 PROCEDURE — 99233 SBSQ HOSP IP/OBS HIGH 50: CPT | Performed by: INTERNAL MEDICINE

## 2021-04-07 PROCEDURE — 74011000250 HC RX REV CODE- 250: Performed by: NURSE PRACTITIONER

## 2021-04-07 PROCEDURE — 86140 C-REACTIVE PROTEIN: CPT

## 2021-04-07 PROCEDURE — 82962 GLUCOSE BLOOD TEST: CPT

## 2021-04-07 PROCEDURE — 97530 THERAPEUTIC ACTIVITIES: CPT

## 2021-04-07 PROCEDURE — 74011250637 HC RX REV CODE- 250/637: Performed by: FAMILY MEDICINE

## 2021-04-07 PROCEDURE — 84145 PROCALCITONIN (PCT): CPT

## 2021-04-07 PROCEDURE — 85610 PROTHROMBIN TIME: CPT

## 2021-04-07 PROCEDURE — 36415 COLL VENOUS BLD VENIPUNCTURE: CPT

## 2021-04-07 PROCEDURE — 85730 THROMBOPLASTIN TIME PARTIAL: CPT

## 2021-04-07 PROCEDURE — 74011636637 HC RX REV CODE- 636/637: Performed by: NURSE PRACTITIONER

## 2021-04-07 PROCEDURE — 85025 COMPLETE CBC W/AUTO DIFF WBC: CPT

## 2021-04-07 RX ORDER — IBUPROFEN 200 MG
1 TABLET ORAL DAILY
Status: DISCONTINUED | OUTPATIENT
Start: 2021-04-08 | End: 2021-04-09 | Stop reason: HOSPADM

## 2021-04-07 RX ORDER — BUDESONIDE AND FORMOTEROL FUMARATE DIHYDRATE 160; 4.5 UG/1; UG/1
2 AEROSOL RESPIRATORY (INHALATION)
Status: DISCONTINUED | OUTPATIENT
Start: 2021-04-07 | End: 2021-04-09 | Stop reason: HOSPADM

## 2021-04-07 RX ORDER — AZITHROMYCIN 250 MG/1
500 TABLET, FILM COATED ORAL DAILY
Status: DISCONTINUED | OUTPATIENT
Start: 2021-04-07 | End: 2021-04-07

## 2021-04-07 RX ORDER — OMEGA-3-ACID ETHYL ESTERS 1 G/1
1 CAPSULE, LIQUID FILLED ORAL 2 TIMES DAILY WITH MEALS
Status: DISCONTINUED | OUTPATIENT
Start: 2021-04-07 | End: 2021-04-09 | Stop reason: HOSPADM

## 2021-04-07 RX ORDER — DEXAMETHASONE SODIUM PHOSPHATE 4 MG/ML
6 INJECTION, SOLUTION INTRA-ARTICULAR; INTRALESIONAL; INTRAMUSCULAR; INTRAVENOUS; SOFT TISSUE EVERY 12 HOURS
Status: DISCONTINUED | OUTPATIENT
Start: 2021-04-07 | End: 2021-04-09 | Stop reason: HOSPADM

## 2021-04-07 RX ORDER — CHOLECALCIFEROL (VITAMIN D3) 125 MCG
5000 CAPSULE ORAL DAILY
Status: DISCONTINUED | OUTPATIENT
Start: 2021-04-07 | End: 2021-04-09 | Stop reason: HOSPADM

## 2021-04-07 RX ORDER — CALCIUM CARB/MAGNESIUM CARB 311-232MG
5 TABLET ORAL
Status: DISCONTINUED | OUTPATIENT
Start: 2021-04-07 | End: 2021-04-09 | Stop reason: HOSPADM

## 2021-04-07 RX ADMIN — Medication 5000 UNITS: at 13:54

## 2021-04-07 RX ADMIN — AZITHROMYCIN MONOHYDRATE 500 MG: 250 TABLET ORAL at 18:23

## 2021-04-07 RX ADMIN — INSULIN LISPRO 2 UNITS: 100 INJECTION, SOLUTION INTRAVENOUS; SUBCUTANEOUS at 07:54

## 2021-04-07 RX ADMIN — Medication 10 ML: at 21:23

## 2021-04-07 RX ADMIN — DEXAMETHASONE SODIUM PHOSPHATE 6 MG: 4 INJECTION, SOLUTION INTRAMUSCULAR; INTRAVENOUS at 10:09

## 2021-04-07 RX ADMIN — GUAIFENESIN 600 MG: 600 TABLET, EXTENDED RELEASE ORAL at 10:09

## 2021-04-07 RX ADMIN — OMEGA-3-ACID ETHYL ESTERS 1000 MG: 1 CAPSULE, LIQUID FILLED ORAL at 18:23

## 2021-04-07 RX ADMIN — IPRATROPIUM BROMIDE AND ALBUTEROL 1 PUFF: 20; 100 SPRAY, METERED RESPIRATORY (INHALATION) at 13:48

## 2021-04-07 RX ADMIN — GUAIFENESIN 600 MG: 600 TABLET, EXTENDED RELEASE ORAL at 21:22

## 2021-04-07 RX ADMIN — BUDESONIDE AND FORMOTEROL FUMARATE DIHYDRATE 2 PUFF: 160; 4.5 AEROSOL RESPIRATORY (INHALATION) at 20:00

## 2021-04-07 RX ADMIN — Medication 10 ML: at 01:15

## 2021-04-07 RX ADMIN — ZINC SULFATE 220 MG (50 MG) CAPSULE 1 CAPSULE: CAPSULE at 10:09

## 2021-04-07 RX ADMIN — Medication 10 ML: at 10:10

## 2021-04-07 RX ADMIN — ACETAMINOPHEN 650 MG: 325 TABLET ORAL at 16:33

## 2021-04-07 RX ADMIN — ACETAMINOPHEN 650 MG: 325 TABLET ORAL at 04:39

## 2021-04-07 RX ADMIN — Medication 10 ML: at 18:27

## 2021-04-07 RX ADMIN — Medication 500 MG: at 21:22

## 2021-04-07 RX ADMIN — Medication 5 MG: at 21:22

## 2021-04-07 RX ADMIN — ENOXAPARIN SODIUM 40 MG: 40 INJECTION SUBCUTANEOUS at 21:22

## 2021-04-07 RX ADMIN — IPRATROPIUM BROMIDE AND ALBUTEROL SULFATE 3 ML: .5; 3 SOLUTION RESPIRATORY (INHALATION) at 00:00

## 2021-04-07 RX ADMIN — IPRATROPIUM BROMIDE AND ALBUTEROL SULFATE 3 ML: .5; 3 SOLUTION RESPIRATORY (INHALATION) at 05:24

## 2021-04-07 RX ADMIN — DEXAMETHASONE SODIUM PHOSPHATE 6 MG: 4 INJECTION, SOLUTION INTRAMUSCULAR; INTRAVENOUS at 21:22

## 2021-04-07 RX ADMIN — Medication 500 MG: at 10:09

## 2021-04-07 RX ADMIN — IPRATROPIUM BROMIDE AND ALBUTEROL 1 PUFF: 20; 100 SPRAY, METERED RESPIRATORY (INHALATION) at 20:00

## 2021-04-07 NOTE — H&P
History & Physical    Patient: Kaitlin Perez MRN: [id number]  [facility]: [id number]    YOB: 1970  Age: 48 y.o. Sex: male      DOA: 4/6/2021    Chief Complaint:   Chief Complaint   Patient presents with    Positive For Covid-19    Shortness of Breath          HPI:     Kaitlin Perez is a 48 y.o.  male with hx of HTN, asthma, tobacco abuse and recent diagnosis of Covid-19 Pneumonia at [facility] (3/30/2021; discharged on oral abx from ED and Tessalon perles) who presented to the ED today with SOB and headache. Pt reports compliance with meds but does not feel he is improving, also having to use his inhaler more often. Associated symptoms include change in bowel habits and change in taste. He was exposed to Covid-19 by his daughter- she is doing well. No c/o fever/chills, chest pain, palpitations, abdominal pain, N/V. Pt febrile in the ED with temp of 102., tachycardia, initially oxgen sats 93% on RA but down to 88% with ambulation and placed on 2L NC. CRP 18.7, D-dimer elevated at 3.18 , CTa chest negative for PE but note extensive groundglass lung infiltrates. ID consulted, pt started on IV Ceftriaxone and Zithromax and given Decadon 6 mg IV x 1. He is not a candidate for Remdesivir due to >5 days of dx of Covid-19 Pneumonia. We are asked to admit for further management of care. Pt is not on oxygen at the time of my evaluation, oxygen sats 91-93% on RA but note ROJAS just with sitting up in bed for physical exam and occasional desat to 88%. He does not have a PCP. Past Medical History:   Diagnosis Date    Asthma     COVID-19     tested positive at [facility] 3/30/2021    Hypertension     not on meds per patient    Tobacco abuse        History reviewed. No pertinent surgical history. History reviewed. No pertinent family history.     Social History     Socioeconomic History    Marital status: SINGLE     Spouse name: Not on file    Number of children: Not on file  Years of education: Not on file    Highest education level: Not on file       Prior to Admission medications    Medication Sig Start Date End Date Taking? Authorizing Provider   benzonatate (Tessalon Perles) 100 mg capsule Take 100 mg by mouth three (3) times daily as needed for Cough. Yes Provider, Historical   albuterol (PROVENTIL HFA, VENTOLIN HFA, PROAIR HFA) 90 mcg/actuation inhaler Take 2 Puffs by inhalation every four (4) hours as needed for Wheezing. 20  Yes Anika Mart PA-C   azithromycin [facility]) 250 mg tablet Use as directed 20   Anika Mart PA-C   methocarbamol (ROBAXIN) 500 mg tablet Take 1.5 Tabs by mouth four (4) times daily. 16   Helena White MD       No Known Allergies      Review of Systems- positive in bold  GENERAL: No fever, chills, malaise, weight changes  HEENT: No change in vision, no earache, tinnitus, sore throat or sinus congestion. NECK: No pain or stiffness. PULMONARY: +shortness of breath, cough, no wheeze. Cardiovascular: no pnd or orthopnea, no CP  GASTROINTESTINAL: No abdominal pain, nausea, vomiting or diarrhea, melena or bright red blood per rectum. GENITOURINARY: No urinary frequency, urgency, hesitancy or dysuria. MUSCULOSKELETAL: No joint or muscle pain, no back pain, no recent trauma. DERMATOLOGIC: No rash, no itching, no lesions. ENDOCRINE: No polyuria, polydipsia, no heat or cold intolerance. No recent change in weight. HEMATOLOGICAL: No anemia or easy bruising or bleeding. NEUROLOGIC: No headache, seizures, numbness, tingling or weakness. Physical Exam:     Physical Exam:  Visit Vitals  /85   Pulse 100   Temp 99.7 °F (37.6 °C)   Resp 26   SpO2 92%    O2 Flow Rate (L/min): 2 l/min O2 Device: Nasal cannula    Temp (24hrs), Av.3 °F (38.5 °C), Min:99.7 °F (37.6 °C), Max:102.1 °F (38.9 °C)    No intake/output data recorded. No intake/output data recorded.     General:  Alert, cooperative, no distress, appears stated age. Head: Normocephalic, without obvious abnormality, atraumatic. Eyes:  Conjunctivae/corneas clear. PERRL, EOMs intact. Nose: Nares normal. No drainage or sinus tenderness. Neck: Supple, symmetrical, trachea midline, no adenopathy, thyroid: no enlargement, no carotid bruit and no JVD. Lungs:   Diminished breath sounds bilaterally, mild conversational dyspnea. Heart:  Regular rate and rhythm, S1, S2 normal.     Abdomen: Soft, non-tender. Bowel sounds normal.    Extremities: Extremities normal, atraumatic, no cyanosis or edema. Pulses: 2+ and symmetric all extremities. Skin:  No rashes or lesions   Neurologic: AAOx3, No focal motor or sensory deficit. Labs Reviewed: All lab results for the last 24 hours reviewed. Recent Results (from the past 24 hour(s))   METABOLIC PANEL, COMPREHENSIVE    Collection Time: 04/06/21  4:15 PM   Result Value Ref Range    Sodium 132 (L) 136 - 145 mmol/L    Potassium 4.2 3.5 - 5.5 mmol/L    Chloride 96 (L) 100 - 111 mmol/L    CO2 29 21 - 32 mmol/L    Anion gap 7 3.0 - 18 mmol/L    Glucose 103 (H) 74 - 99 mg/dL    BUN 18 7.0 - 18 MG/DL    Creatinine 1.24 0.6 - 1.3 MG/DL    BUN/Creatinine ratio 15 12 - 20      GFR est AA >60 >60 ml/min/1.73m2    GFR est non-AA >60 >60 ml/min/1.73m2    Calcium 8.8 8.5 - 10.1 MG/DL    Bilirubin, total 1.0 0.2 - 1.0 MG/DL    ALT (SGPT) 70 (H) 16 - 61 U/L    AST (SGOT) 93 (H) 10 - 38 U/L    Alk.  phosphatase 65 45 - 117 U/L    Protein, total 8.6 (H) 6.4 - 8.2 g/dL    Albumin 3.2 (L) 3.4 - 5.0 g/dL    Globulin 5.4 (H) 2.0 - 4.0 g/dL    A-G Ratio 0.6 (L) 0.8 - 1.7     CBC WITH AUTOMATED DIFF    Collection Time: 04/06/21  4:15 PM   Result Value Ref Range    WBC 5.9 4.6 - 13.2 K/uL    RBC 5.10 4.35 - 5.65 M/uL    HGB 16.0 13.0 - 16.0 g/dL    HCT 44.7 36.0 - 48.0 %    MCV 87.6 74.0 - 97.0 FL    MCH 31.4 24.0 - 34.0 PG    MCHC 35.8 31.0 - 37.0 g/dL    RDW 12.9 11.6 - 14.5 %    PLATELET 146 026 - 390 K/uL    MPV 9.1 (L) 9.2 - 11.8 FL    NEUTROPHILS 76 (H) 40 - 73 %    LYMPHOCYTES 17 (L) 21 - 52 %    MONOCYTES 7 3 - 10 %    EOSINOPHILS 0 0 - 5 %    BASOPHILS 0 0 - 2 %    ABS. NEUTROPHILS 4.5 1.8 - 8.0 K/UL    ABS. LYMPHOCYTES 1.0 0.9 - 3.6 K/UL    ABS. MONOCYTES 0.4 0.05 - 1.2 K/UL    ABS. EOSINOPHILS 0.0 0.0 - 0.4 K/UL    ABS. BASOPHILS 0.0 0.0 - 0.1 K/UL    DF MANUAL      PLATELET COMMENTS ADEQUATE PLATELETS      RBC COMMENTS NORMOCYTIC, NORMOCHROMIC     D DIMER    Collection Time: 04/06/21  4:15 PM   Result Value Ref Range    D DIMER 3.18 (H) <0.46 ug/ml(FEU)   TROPONIN I    Collection Time: 04/06/21  4:15 PM   Result Value Ref Range    Troponin-I, QT <0.02 0.0 - 0.045 NG/ML   C REACTIVE PROTEIN, QT    Collection Time: 04/06/21  4:15 PM   Result Value Ref Range    C-Reactive protein 18.7 (H) 0 - 0.3 mg/dL   PROCALCITONIN    Collection Time: 04/06/21  4:15 PM   Result Value Ref Range    Procalcitonin 0.06 ng/mL   MAGNESIUM    Collection Time: 04/06/21  4:15 PM   Result Value Ref Range    Magnesium 2.6 1.6 - 2.6 mg/dL   POC LACTIC ACID    Collection Time: 04/06/21  4:21 PM   Result Value Ref Range    Lactic Acid (POC) 1.86 0.40 - 2.00 mmol/L     CT Results  (Last 48 hours)               04/06/21 1917  CTA CHEST W OR W WO CONT Final result    Impression:      1. No evidence of pulmonary embolism. 2. Extensive groundglass lung infiltrates, typical in appearance for Covid-19   pneumonia. 3. Partially imaged left adrenal nodule. This can be assessed with nonemergent   CT abdomen adrenal protocol with and without IV contrast.       Narrative:  CTA CHEST PULMONARY EMBOLISM PROTOCOL         INDICATION: Progressive increased shortness of breath since testing positive for   Covid-19 one week ago. Question pulmonary embolism.        TECHNIQUE: Thin collimation axial images obtained through the level of the   pulmonary arteries with additional imaging through the chest following the   uneventful administration of 100 cc Isovue-370 intravenous contrast.  Images   reconstructed into MIP coronal and sagittal projections for complete evaluation   of the tortuous and overlapping pulmonary vascular structures and to reduce   patient radiation dose. All CT scans are performed using dose optimization   techniques as appropriate to the performed exam including the following:   Automated exposure control, adjustment of mA and/or kV according to patient   size, and use of iterative reconstructive technique. COMPARISON: .       FINDINGS:       No filling defects are appreciated within the main, left, right, lobar or   visualized segmental pulmonary arteries to suggest embolism. Subsegmental   branches are motion limited. The thoracic aorta is not aneurysmal.  No evidence   for dissection. No pericardial effusion. No pleural effusion. No enlarged axillary, hilar, or   mediastinal lymphadenopathy. No acute bone finding. Extensive, multifocal peripheral groundglass lung opacities. Partially imaged 2.2 cm left adrenal nodule. Procedures/imaging: see electronic medical records for all procedures/Xrays and details which were not copied into this note but were reviewed prior to creation of Plan      Assessment/Plan        Assessment  1. SIRS POA with fever, tachycardia, hypoxia  2. Covid-19 pneumonia, POSITIVE test on 3/30 @[facility], failed outpatient treatment  3. Acute hypoxic respiratory failure secondary to #2  4. Hyponatremia, mild  5. Transaminitis, mild  6. Hypertension  7. Asthma        Plan  1. ID consulted by ED, appreciate assistance. Continue IV Zithromax, Ceftriaxone. Not a candidate for Remdesivir due to >5 days since diagnosis for Covid. Decadron 6 mg IV daily. Monitor inflammatory markers daily. Please place on supplemental oxygen 2L NC, wean as tolerated, will need to assess home oxygen needs at dc. Continue droplet plus isolation. Vit C + Zinc, Mucinex BID. 2. Please obtain EKG  3.  Monitor accuchecks ac/hs, correctional SSI since on IV steroids  4. Follow blood cultures. Obtain urine strep pneumo antigen and legionella antigen as previously ordered in ED  5. Trend LFTs  6. Monitor vital signs, weight per unit protocol  7. PT, OT eval and treat  8. Consult CM to assist w/ dc planning, also needs assistance finding PCP  9. Fall precautions        Diet: Cardiac  DVT/GI Prophylaxis: Lovenox and H2B/PPI  Code Status: FULL    Contact: sister Nuno Vee [phone] pt requested his sister to be updated. Called her to update on hospitalization and plan of care. Discussed with patient at bedside about hospital admission and plan care. He understands and agrees. All questions answered. Disclaimer: Sections of this note are dictated using utilizing voice recognition software. Minor typographical errors may be present. If questions arise, please do not hesitate to contact me or call our department.         Silvana Olson, NP-C  [facility]  pager [phone]

## 2021-04-07 NOTE — ROUTINE PROCESS
Bedside and Verbal shift change report given to GURMEET SandyRN by MARGIE Clay RN . Report included the following information SBAR, Kardex, Intake/Output, MAR and Recent Results.

## 2021-04-07 NOTE — ED NOTES
TRANSFER - OUT REPORT:    Verbal report given to La Nena Briggs RN(name) on Rosalba Rosas  being transferred to [address] [place] [address](unit) for routine progression of care       Report consisted of patients Situation, Background, Assessment and   Recommendations(SBAR). Information from the following report(s) SBAR, Kardex, ED Summary, Intake/Output and Recent Results was reviewed with the receiving nurse. Lines:   Peripheral IV 04/06/21 Right Antecubital (Active)   Site Assessment Clean, dry, & intact 04/06/21 1615   Phlebitis Assessment 0 04/06/21 1615   Infiltration Assessment 0 04/06/21 1615   Dressing Status Clean, dry, & intact 04/06/21 1615   Hub Color/Line Status Green 04/06/21 1615        Opportunity for questions and clarification was provided.       Patient transported with:   Tech- transport

## 2021-04-07 NOTE — PROGRESS NOTES
Problem: Risk for Spread of Infection  Goal: Prevent transmission of infectious organism to others  Description: Prevent the transmission of infectious organisms to other patients, staff members, and visitors. Outcome: Progressing Towards Goal     Problem: Falls - Risk of  Goal: *Absence of Falls  Description: Document Braydonriley Hajaon Fall Risk and appropriate interventions in the flowsheet. Outcome: Progressing Towards Goal  Note: Fall Risk Interventions:            Medication Interventions: Teach patient to arise slowly, Patient to call before getting OOB, Evaluate medications/consider consulting pharmacy, Bed/chair exit alarm                   Problem: Pain  Goal: *Control of Pain  Outcome: Progressing Towards Goal     Problem: Pressure Injury - Risk of  Goal: *Prevention of pressure injury  Description: Document Kev Scale and appropriate interventions in the flowsheet. Outcome: Progressing Towards Goal  Note: Pressure Injury Interventions:             Activity Interventions: Increase time out of bed, Pressure redistribution bed/mattress(bed type)         Nutrition Interventions: Document food/fluid/supplement intake, Offer support with meals,snacks and hydration                     Problem: Injury - Risk of, Adverse Drug Event  Goal: *Absence of adverse drug events  Outcome: Progressing Towards Goal  Goal: *Absence of medication errors  Outcome: Progressing Towards Goal  Goal: *Knowledge of prescribed medications  Outcome: Progressing Towards Goal

## 2021-04-07 NOTE — ROUTINE PROCESS
TRANSFER - IN REPORT: 
 
Verbal report received from Khai Alfaro (name) on Bud Gens  being received from ED (unit) for routine progression of care Report consisted of patients Situation, Background, Assessment and  
Recommendations(SBAR). Information from the following report(s) SBAR, ED Summary, Procedure Summary, MAR and Recent Results was reviewed with the receiving nurse. Opportunity for questions and clarification was provided. Assessment completed upon patients arrival to unit and care assumed.

## 2021-04-07 NOTE — PROGRESS NOTES
Problem: Mobility Impaired (Adult and Pediatric)  Goal: *Acute Goals and Plan of Care (Insert Text)  Outcome: Resolved/Met    PHYSICAL THERAPY EVALUATION AND DISCHARGE    Patient: Nadira Beltran (54 y.o. male)  Date: 4/7/2021  Primary Diagnosis: COVID-19 [U07.1]  Hypoxia [R09.02]  Fever [R50.9]        Precautions:   Fall(droplet plus )    PLOF: reports living in 2 story house with 2 GEORGINA with handrails. Pt independent without AD, lives with family/friend who is able to assist him if needed. ASSESSMENT :  Based on the objective data described below, the patient presents with baseline functional mobility and reporting independence. Pt found supine in bed and agreeable to therapy session. O2 off in bed, SPO2 at rest on RA 93%. Pt then completing independent bed mobility, pt SPO2 in sitting EOB at 88%. 2L O2 donned throughout remainder of session. Pt sitting with good sitting balance and demonstrating appropriate LE strength. Pt donning shoes independently, Pt then ambulating in room x50 feet with independence and no loss of balance. SPO2 at 92% after mobility. Pt returned to sitting EOB, encouraged to get up for all meals at EOB or in recliner. Pt left with all needs met and call bell in reach. Pt reporting he has been ambulating to bathroom independently in room. Patient does not require further skilled intervention at this level of care. PLAN :  Recommendations and Planned Interventions:   No formal PT needs identified at this time. Discharge Recommendations: Home Health safety check  Further Equipment Recommendations for Discharge: N/A     SUBJECTIVE:   Patient stated I just want to feel better.     OBJECTIVE DATA SUMMARY:     Past Medical History:   Diagnosis Date    Asthma     COVID-19     tested positive at [facility] 3/30/2021    Hypertension     not on meds per patient    Tobacco abuse    History reviewed. No pertinent surgical history.   Barriers to Learning/Limitations: None  Compensate with: N/A  Home Situation:   Home Situation  Home Environment: Private residence  # Steps to Enter: 2  Rails to Enter: Yes  Wheelchair Ramp: No  One/Two Story Residence: Two story  Lift Chair Available: No  Living Alone: No  Support Systems: (reporting someone lives with him )  Patient Expects to be Discharged to[de-identified] Apartment  Current DME Used/Available at Home: None  Critical Behavior:  Neurologic State: Alert  Orientation Level: Oriented X4  Cognition: Follows commands  Safety/Judgement: Awareness of environment; Fall prevention  Psychosocial  Patient Behaviors: Calm; Cooperative    Strength:    Strength: Within functional limits    Tone & Sensation:   Tone: Normal              Sensation: Intact    Range Of Motion:  AROM: Within functional limits    Posture:  Posture (WDL): Within defined limits      Functional Mobility:  Bed Mobility:  Rolling: Independent  Supine to Sit: Independent  Sit to Supine: Independent  Scooting: Independent  Transfers:  Sit to Stand: Independent  Stand to Sit: Independent    Balance:   Sitting: Intact  Standing: Intact    Ambulation/Gait Training:    Gait Description (WDL): Within defined limits    Pain:  Pain level pre-treatment: 0/10   Pain level post-treatment: 0/10      Activity Tolerance:   Good activity tolerance   Please refer to the flowsheet for vital signs taken during this treatment. After treatment:   []         Patient left in no apparent distress sitting up in chair  [x]         Patient left in no apparent distress sitting Edge of bed  [x]         Call bell left within reach  [x]         Nursing notified  []         Caregiver present  []         Bed alarm activated  []         SCDs applied    COMMUNICATION/EDUCATION:   [x]         Role of Physical Therapy in the acute care setting. [x]         Fall prevention education was provided and the patient/caregiver indicated understanding. []         Patient/family have participated as able in goal setting and plan of care.   [] Patient/family agree to work toward stated goals and plan of care. []         Patient understands intent and goals of therapy, but is neutral about his/her participation. []         Patient is unable to participate in goal setting/plan of care: ongoing with therapy staff.  []         Other:     Thank you for this referral.  Matilde Nathan, PT   Time Calculation: 23 mins      Eval Complexity: History: MEDIUM  Complexity : 1-2 comorbidities / personal factors will impact the outcome/ POC Exam:LOW Complexity : 1-2 Standardized tests and measures addressing body structure, function, activity limitation and / or participation in recreation  Presentation: LOW Complexity : Stable, uncomplicated  Clinical Decision Making:Low Complexity low  Overall Complexity:LOW

## 2021-04-07 NOTE — CONSULTS
Infectious Disease Consultation Note        [facility]    Current abx Prior abx         Lines: PIV      Assessment :  40-year-old male with history of HTN, asthma and tobacco abuse who was admitted with worsening COVID-19 symptoms:    1-COVID-19 pneumonia: With hypoxemic respiratory failure requiring oxygencurrently on 2 L   -Symptoms started 8 days ago-tested positive at [facility] 3/30  -CTA chest negative for PE but positive for bilateral multifocal opacities  -Fever of 102 yesterday, slight leukopenia/lymphopenia  -Normal renal function with slight rise in LFTs  -CRP 18.7 down to 16.5, , troponin normal, procalcitonin 0.05, D-dimer 2.8  -Blood cultures 4/6 in progress  -On Decadron, CTX and azithromycin 4/6    2-asthma  3-HTN    Recommendations:  Continue dexamethasone 6 mg daily x10 days  We will stop antibiotics as he has received doxycycline and his procalcitonin is negative  Monitor inflammatory markers in the next couple of days  Supportive care and precautions per primary team-titrating oxygen as needed    Advance Care planning: discussed  with patient    Thank you for consultation request. Above plan was discussed in details with patient, family and dr Randal Mccain. please call me if any further questions or concerns. Will continue to participate in the care of this patient. HPI:    Mr. Desirae Cline is a 40-year-old male with history of HTN, asthma and tobacco abuse who was admitted with worsening COVID-19 symptoms. Patient reports having some fatigue and chills that started about a week ago. He was seen at [facility] and was tested positive for COVID-19 in the ED on 3/30. He he was given doxycycline and was discharged home and was instructed to go to the hospital if his symptoms worsen. He reports during the ensuing days despite taking the doxycycline he continued to feel tired and had some fevers and chills.   He did continue to have some nausea and a couple of days of diarrhea which has resolved now. 2 to 3 days ago he started to have some dry cough along with some wheezing. His shortness of breath also increased and due to his asthma he feared an exacerbation came to the hospital.  On admission he had a fever of 102. His white count was 5.9 with some lymphopenia. He was hypoxemic requiring about 6 L of oxygen first but it was reduced to 2 L to keep his sats above 94%. His CT chest was negative for PE, however showed bilateral multifocal peripheral groundglass opacities. He was started on dexamethasone and ceftriaxone and azithromycin. When I saw him he reported some chills along with body aches and just feeling unwell overall. He still has some shortness of breath but okay while on oxygen. He wants to cough more to bring him some phlegm because he is having dry cough. He is comfortable than yesterday. Past Medical History:   Diagnosis Date    Asthma     COVID-19     tested positive at [facility] 3/30/2021    Hypertension     not on meds per patient    Tobacco abuse        History reviewed. No pertinent surgical history. Current Discharge Medication List      CONTINUE these medications which have NOT CHANGED    Details   benzonatate (Tessalon Perles) 100 mg capsule Take 100 mg by mouth three (3) times daily as needed for Cough. albuterol (PROVENTIL HFA, VENTOLIN HFA, PROAIR HFA) 90 mcg/actuation inhaler Take 2 Puffs by inhalation every four (4) hours as needed for Wheezing. Qty: 1 Inhaler, Refills: 0      azithromycin (ZITHROMAX) 250 mg tablet Use as directed  Qty: 6 Tab, Refills: 0      methocarbamol (ROBAXIN) 500 mg tablet Take 1.5 Tabs by mouth four (4) times daily.   Qty: 14 Tab, Refills: 0             Current Facility-Administered Medications   Medication Dose Route Frequency    ipratropium-albuterol (COMBIVENT RESPIMAT) 20 mcg-100 mcg inhalation spray  1 Puff Inhalation TID RT    dexamethasone (DECADRON) 4 mg/mL injection 6 mg  6 mg IntraVENous Q12H    cholecalciferol (VITAMIN D3) capsule 5,000 Units  5,000 Units Oral DAILY    budesonide-formoteroL (SYMBICORT) 160-4.5 mcg/actuation HFA inhaler 2 Puff  2 Puff Inhalation BID RT    azithromycin (ZITHROMAX) tablet 500 mg  500 mg Oral DAILY    omega-3 acid ethyl esters (LOVAZA) capsule 1,000 mg  1 g Oral BID WITH MEALS    melatonin (rapid dissolve) tablet 5 mg  5 mg Oral QHS    [START ON 4/8/2021] nicotine (NICODERM CQ) 14 mg/24 hr patch 1 Patch  1 Patch TransDERmal DAILY    sodium chloride (NS) flush 5-10 mL  5-10 mL IntraVENous PRN    sodium chloride (NS) flush 5-40 mL  5-40 mL IntraVENous Q8H    sodium chloride (NS) flush 5-40 mL  5-40 mL IntraVENous PRN    acetaminophen (TYLENOL) tablet 650 mg  650 mg Oral Q6H PRN    Or    acetaminophen (TYLENOL) suppository 650 mg  650 mg Rectal Q6H PRN    polyethylene glycol (MIRALAX) packet 17 g  17 g Oral DAILY PRN    promethazine (PHENERGAN) tablet 12.5 mg  12.5 mg Oral Q6H PRN    Or    ondansetron (ZOFRAN) injection 4 mg  4 mg IntraVENous Q6H PRN    guaiFENesin-dextromethorphan (ROBITUSSIN DM) 100-10 mg/5 mL syrup 5 mL  5 mL Oral Q4H PRN    enoxaparin (LOVENOX) injection 40 mg  40 mg SubCUTAneous Q24H    ascorbic acid (vitamin C) (VITAMIN C) tablet 500 mg  500 mg Oral BID    zinc sulfate (ZINCATE) 50 mg zinc (220 mg) capsule 1 Cap  1 Cap Oral DAILY    insulin lispro (HUMALOG) injection   SubCUTAneous AC&HS    glucose chewable tablet 16 g  4 Tab Oral PRN    glucagon (GLUCAGEN) injection 1 mg  1 mg IntraMUSCular PRN    dextrose (D50W) injection syrg 12.5-25 g  25-50 mL IntraVENous PRN    guaiFENesin ER (MUCINEX) tablet 600 mg  600 mg Oral Q12H       Allergies: Patient has no known allergies. History reviewed. No pertinent family history.   Social History     Socioeconomic History    Marital status: SINGLE     Spouse name: Not on file    Number of children: Not on file    Years of education: Not on file    Highest education level: Not on file Occupational History    Not on file   Social Needs    Financial resource strain: Not on file    Food insecurity     Worry: Not on file     Inability: Not on file    Transportation needs     Medical: Not on file     Non-medical: Not on file   Tobacco Use    Smoking status: Not on file   Substance and Sexual Activity    Alcohol use: Not on file    Drug use: Not on file    Sexual activity: Not on file   Lifestyle    Physical activity     Days per week: Not on file     Minutes per session: Not on file    Stress: Not on file   Relationships    Social connections     Talks on phone: Not on file     Gets together: Not on file     Attends Rastafari service: Not on file     Active member of club or organization: Not on file     Attends meetings of clubs or organizations: Not on file     Relationship status: Not on file    Intimate partner violence     Fear of current or ex partner: Not on file     Emotionally abused: Not on file     Physically abused: Not on file     Forced sexual activity: Not on file   Other Topics Concern    Not on file   Social History Narrative    Not on file     Social History     Tobacco Use   Smoking Status Not on file        Temp (24hrs), Av °F (37.8 °C), Min:99.1 °F (37.3 °C), Max:101.1 °F (38.4 °C)    Visit Vitals  /81 (BP 1 Location: Right upper arm, BP Patient Position: At rest)   Pulse 95   Temp (!) 101.1 °F (38.4 °C)   Resp 20   Ht 5' 6\" (1.676 m)   Wt 107.6 kg (237 lb 3.2 oz)   SpO2 100%   BMI 38.29 kg/m²       ROS: 12 point ROS obtained in details. Pertinent positives as mentioned in HPI,   otherwise negative    Physical Exam:    General: Well developed, well nourished male laying on the bed, AAOx3 in no acute distress. General:   awake alert and oriented   HEENT:  Normocephalic, atraumatic, PERRL, EOMI, no scleral icterus or pallor; no conjunctival hemmohage;  nasal and oral mucous are moist and without evidence of lesions. No thrush. Dentition good.  Neck supple   Lymph Nodes:   Not examined   Lungs:   non-labored, bilaterally clear to auscultation- no crackles wheezes rales or rhonchi   Heart:  RRR, s1 and s2; no murmurs rubs or gallops, no edema,   Abdomen:  soft, non-distended, active bowel sounds. Non-tender   Genitourinary:  deferred   Extremities:   No edema   Neurologic:  No gross focal sensory abnormalities; 5/5 muscle strength to upper and lower extremities. Speech appropriate. Cranial nerves intact                        Skin:  No rash or ulcers noted   Wound:       Back:  no spinal or paraspinal muscle tenderness or rigidity, no CVA tenderness     Psychiatric:  No suicidal or homicidal ideations, appropriate mood and affect         Labs: Results:   Chemistry Recent Labs     04/07/21  0413 04/06/21  1615   * 103*   * 132*   K 4.9 4.2    96*   CO2 25 29   BUN 18 18   CREA 1.02 1.24   CA 7.9* 8.8   AGAP 8 7   BUCR 18 15   AP 59 65   TP 7.0 8.6*   ALB 2.8* 3.2*   GLOB 4.2* 5.4*   AGRAT 0.7* 0.6*      CBC w/Diff Recent Labs     04/07/21  0413 04/06/21  1615   WBC 3.6* 5.9   RBC 4.57 5.10   HGB 14.3 16.0   HCT 39.9 44.7    246   GRANS 77* 76*   LYMPH 17* 17*   EOS 0 0      Microbiology Recent Labs     04/06/21  1625 04/06/21  1615   CULT NO GROWTH AFTER 14 HOURS NO GROWTH AFTER 14 HOURS          RADIOLOGY:    All available imaging studies/reports in [facility] for this admission were reviewed      Disclaimer: Sections of this note are dictated utilizing voice recognition software, which may have resulted in some phonetic based errors in grammar and contents. Even though attempts were made to correct all the mistakes, some may have been missed, and remained in the body of the document. If questions arise, please contact our department.     Dr. Rao Zaragoza, Infectious Disease Specialist  [phone]  April 7, 2021  6:13 PM

## 2021-04-07 NOTE — PROGRESS NOTES
OT order received and chart reviewed. Patient is independent with basic self care tasks and functional mobility. Patient has been performing toileting/ADL tasks in room with no physical assistance needed and has friends/family nearby for support. No skilled OT indicated at this time; will complete the order.         Thank you for the referral,  Aniceto Sweeney MS OTR/L

## 2021-04-07 NOTE — ROUTINE PROCESS
Bedside shift change report given to Aristides Puri (oncoming nurse) by Reshma Hylton (offgoing nurse). Report included the following information SBAR, Procedure Summary, MAR and Recent Results.

## 2021-04-07 NOTE — PROGRESS NOTES
Hospitalist Progress Note    Subjective:   Daily Progress Note: 4/7/2021     Patient states he was diagnosed with Covid on March 31, 2021 when he had fever and some shortness of breath. However, he started having more shortness of breath yesterday so decided to come to the emergency room. He was saturating 85 percentage on room air so he was started on oxygen and admitted here. He feels fine currently. Denies any headaches or dizziness. No chest pain or abdominal pain currently. Shortness of breath getting better. Cough present. Fever present. Body ache present. Diarrhea is much better. No nausea or vomiting.     Current Facility-Administered Medications   Medication Dose Route Frequency    ipratropium-albuterol (COMBIVENT RESPIMAT) 20 mcg-100 mcg inhalation spray  1 Puff Inhalation TID RT    dexamethasone (DECADRON) 4 mg/mL injection 6 mg  6 mg IntraVENous Q12H    cholecalciferol (VITAMIN D3) capsule 5,000 Units  5,000 Units Oral DAILY    budesonide-formoteroL (SYMBICORT) 160-4.5 mcg/actuation HFA inhaler 2 Puff  2 Puff Inhalation BID RT    sodium chloride (NS) flush 5-10 mL  5-10 mL IntraVENous PRN    sodium chloride (NS) flush 5-40 mL  5-40 mL IntraVENous Q8H    sodium chloride (NS) flush 5-40 mL  5-40 mL IntraVENous PRN    acetaminophen (TYLENOL) tablet 650 mg  650 mg Oral Q6H PRN    Or    acetaminophen (TYLENOL) suppository 650 mg  650 mg Rectal Q6H PRN    polyethylene glycol (MIRALAX) packet 17 g  17 g Oral DAILY PRN    promethazine (PHENERGAN) tablet 12.5 mg  12.5 mg Oral Q6H PRN    Or    ondansetron (ZOFRAN) injection 4 mg  4 mg IntraVENous Q6H PRN    guaiFENesin-dextromethorphan (ROBITUSSIN DM) 100-10 mg/5 mL syrup 5 mL  5 mL Oral Q4H PRN    enoxaparin (LOVENOX) injection 40 mg  40 mg SubCUTAneous Q24H    ascorbic acid (vitamin C) (VITAMIN C) tablet 500 mg  500 mg Oral BID    zinc sulfate (ZINCATE) 50 mg zinc (220 mg) capsule 1 Cap  1 Cap Oral DAILY    insulin lispro (HUMALOG) injection   SubCUTAneous AC&HS    glucose chewable tablet 16 g  4 Tab Oral PRN    glucagon (GLUCAGEN) injection 1 mg  1 mg IntraMUSCular PRN    dextrose (D50W) injection syrg 12.5-25 g  25-50 mL IntraVENous PRN    guaiFENesin ER (MUCINEX) tablet 600 mg  600 mg Oral Q12H        Review of Systems  Pertinent items are noted in HPI. Objective:     Visit Vitals  /87 (BP 1 Location: Right upper arm, BP Patient Position: At rest)   Pulse 92   Temp 99.9 °F (37.7 °C)   Resp 20   Ht 5' 6\" (1.676 m)   Wt 107.6 kg (237 lb 3.2 oz)   SpO2 92%   BMI 38.29 kg/m²    O2 Flow Rate (L/min): 2 l/min O2 Device: Nasal cannula(Using prn)    Temp (24hrs), Av.4 °F (38 °C), Min:99.1 °F (37.3 °C), Max:102.1 °F (38.9 °C)      701 -  1900  In: 420 [P.O.:420]  Out: 600 [Urine:600]  1901 -  0700  In: -   Out: 400 [Urine:400]    General appearance - alert, well appearing, and in no distress  Eyes - sclera anicteric, no pallor  Nose - no obvious nasal discharge. Neck - supple, no JVD, trachea is midline  Chest -clear air entry noted in bases, wheezes present.   Heart - S1 and S2 normal  Abdomen - soft, nontender, nondistended, Bowel sounds present  Neurological - alert, oriented, normal speech, no focal findings noted  Musculoskeletal - no joint tenderness or erythema of knees bilaterally  Extremities - no pedal edema noted    Data Review    Recent Results (from the past 24 hour(s))   CULTURE, BLOOD    Collection Time: 21  4:15 PM    Specimen: Blood   Result Value Ref Range    Special Requests: NO SPECIAL REQUESTS      Culture result: NO GROWTH AFTER 14 HOURS     METABOLIC PANEL, COMPREHENSIVE    Collection Time: 21  4:15 PM   Result Value Ref Range    Sodium 132 (L) 136 - 145 mmol/L    Potassium 4.2 3.5 - 5.5 mmol/L    Chloride 96 (L) 100 - 111 mmol/L    CO2 29 21 - 32 mmol/L    Anion gap 7 3.0 - 18 mmol/L    Glucose 103 (H) 74 - 99 mg/dL    BUN 18 7.0 - 18 MG/DL    Creatinine 1.24 0.6 - 1.3 MG/DL    BUN/Creatinine ratio 15 12 - 20      GFR est AA >60 >60 ml/min/1.73m2    GFR est non-AA >60 >60 ml/min/1.73m2    Calcium 8.8 8.5 - 10.1 MG/DL    Bilirubin, total 1.0 0.2 - 1.0 MG/DL    ALT (SGPT) 70 (H) 16 - 61 U/L    AST (SGOT) 93 (H) 10 - 38 U/L    Alk. phosphatase 65 45 - 117 U/L    Protein, total 8.6 (H) 6.4 - 8.2 g/dL    Albumin 3.2 (L) 3.4 - 5.0 g/dL    Globulin 5.4 (H) 2.0 - 4.0 g/dL    A-G Ratio 0.6 (L) 0.8 - 1.7     CBC WITH AUTOMATED DIFF    Collection Time: 04/06/21  4:15 PM   Result Value Ref Range    WBC 5.9 4.6 - 13.2 K/uL    RBC 5.10 4.35 - 5.65 M/uL    HGB 16.0 13.0 - 16.0 g/dL    HCT 44.7 36.0 - 48.0 %    MCV 87.6 74.0 - 97.0 FL    MCH 31.4 24.0 - 34.0 PG    MCHC 35.8 31.0 - 37.0 g/dL    RDW 12.9 11.6 - 14.5 %    PLATELET 790 391 - 710 K/uL    MPV 9.1 (L) 9.2 - 11.8 FL    NEUTROPHILS 76 (H) 40 - 73 %    LYMPHOCYTES 17 (L) 21 - 52 %    MONOCYTES 7 3 - 10 %    EOSINOPHILS 0 0 - 5 %    BASOPHILS 0 0 - 2 %    ABS. NEUTROPHILS 4.5 1.8 - 8.0 K/UL    ABS. LYMPHOCYTES 1.0 0.9 - 3.6 K/UL    ABS. MONOCYTES 0.4 0.05 - 1.2 K/UL    ABS. EOSINOPHILS 0.0 0.0 - 0.4 K/UL    ABS.  BASOPHILS 0.0 0.0 - 0.1 K/UL    DF MANUAL      PLATELET COMMENTS ADEQUATE PLATELETS      RBC COMMENTS NORMOCYTIC, NORMOCHROMIC     D DIMER    Collection Time: 04/06/21  4:15 PM   Result Value Ref Range    D DIMER 3.18 (H) <0.46 ug/ml(FEU)   TROPONIN I    Collection Time: 04/06/21  4:15 PM   Result Value Ref Range    Troponin-I, QT <0.02 0.0 - 0.045 NG/ML   C REACTIVE PROTEIN, QT    Collection Time: 04/06/21  4:15 PM   Result Value Ref Range    C-Reactive protein 18.7 (H) 0 - 0.3 mg/dL   PROCALCITONIN    Collection Time: 04/06/21  4:15 PM   Result Value Ref Range    Procalcitonin 0.06 ng/mL   MAGNESIUM    Collection Time: 04/06/21  4:15 PM   Result Value Ref Range    Magnesium 2.6 1.6 - 2.6 mg/dL   POC LACTIC ACID    Collection Time: 04/06/21  4:21 PM   Result Value Ref Range    Lactic Acid (POC) 1.86 0.40 - 2.00 mmol/L   CULTURE, BLOOD    Collection Time: 04/06/21  4:25 PM    Specimen: Blood   Result Value Ref Range    Special Requests: NO SPECIAL REQUESTS      Culture result: NO GROWTH AFTER 14 HOURS     EKG, 12 LEAD, INITIAL    Collection Time: 04/06/21  6:18 PM   Result Value Ref Range    Ventricular Rate 96 BPM    Atrial Rate 96 BPM    P-R Interval 136 ms    QRS Duration 76 ms    Q-T Interval 350 ms    QTC Calculation (Bezet) 442 ms    Calculated P Axis 77 degrees    Calculated R Axis 34 degrees    Calculated T Axis 38 degrees    Diagnosis       Normal sinus rhythm  Nonspecific ST abnormality  Abnormal ECG  No previous ECGs available     EKG, 12 LEAD, INITIAL    Collection Time: 04/06/21  9:17 PM   Result Value Ref Range    Ventricular Rate 99 BPM    Atrial Rate 99 BPM    P-R Interval 142 ms    QRS Duration 76 ms    Q-T Interval 342 ms    QTC Calculation (Bezet) 438 ms    Calculated P Axis 48 degrees    Calculated R Axis 34 degrees    Calculated T Axis 43 degrees    Diagnosis       Normal sinus rhythm  Normal ECG  When compared with ECG of 06-APR-2021 18:24,  No significant change was found     GLUCOSE, POC    Collection Time: 04/06/21 11:07 PM   Result Value Ref Range    Glucose (POC) 187 (H) 70 - 110 mg/dL   D DIMER    Collection Time: 04/07/21  4:13 AM   Result Value Ref Range    D DIMER 2.83 (H) <0.46 ug/ml(FEU)   C REACTIVE PROTEIN, QT    Collection Time: 04/07/21  4:13 AM   Result Value Ref Range    C-Reactive protein 16.5 (H) 0 - 0.3 mg/dL   PROCALCITONIN    Collection Time: 04/07/21  4:13 AM   Result Value Ref Range    Procalcitonin <0.05 ng/mL   FIBRINOGEN    Collection Time: 04/07/21  4:13 AM   Result Value Ref Range    Fibrinogen 693 (H) 210 - 451 mg/dL   PTT    Collection Time: 04/07/21  4:13 AM   Result Value Ref Range    aPTT 36.4 23.0 - 36.4 SEC   PROTHROMBIN TIME + INR    Collection Time: 04/07/21  4:13 AM   Result Value Ref Range    Prothrombin time 13.5 11.5 - 15.2 sec    INR 1.0 0.8 - 1.2     FERRITIN    Collection Time: 04/07/21  4:13 AM   Result Value Ref Range    Ferritin 1,892 (H) 8 - 388 NG/ML   LD    Collection Time: 04/07/21  4:13 AM   Result Value Ref Range     (H) 81 - 234 U/L   CBC WITH AUTOMATED DIFF    Collection Time: 04/07/21  4:13 AM   Result Value Ref Range    WBC 3.6 (L) 4.6 - 13.2 K/uL    RBC 4.57 4.35 - 5.65 M/uL    HGB 14.3 13.0 - 16.0 g/dL    HCT 39.9 36.0 - 48.0 %    MCV 87.3 74.0 - 97.0 FL    MCH 31.3 24.0 - 34.0 PG    MCHC 35.8 31.0 - 37.0 g/dL    RDW 12.6 11.6 - 14.5 %    PLATELET 114 091 - 148 K/uL    MPV 9.6 9.2 - 11.8 FL    NEUTROPHILS 77 (H) 40 - 73 %    LYMPHOCYTES 17 (L) 21 - 52 %    MONOCYTES 6 3 - 10 %    EOSINOPHILS 0 0 - 5 %    BASOPHILS 0 0 - 2 %    ABS. NEUTROPHILS 2.8 1.8 - 8.0 K/UL    ABS. LYMPHOCYTES 0.6 (L) 0.9 - 3.6 K/UL    ABS. MONOCYTES 0.2 0.05 - 1.2 K/UL    ABS. EOSINOPHILS 0.0 0.0 - 0.4 K/UL    ABS. BASOPHILS 0.0 0.0 - 0.1 K/UL    DF SMEAR SCANNED      PLATELET COMMENTS ADEQUATE PLATELETS      RBC COMMENTS NORMOCYTIC, NORMOCHROMIC     METABOLIC PANEL, COMPREHENSIVE    Collection Time: 04/07/21  4:13 AM   Result Value Ref Range    Sodium 133 (L) 136 - 145 mmol/L    Potassium 4.9 3.5 - 5.5 mmol/L    Chloride 100 100 - 111 mmol/L    CO2 25 21 - 32 mmol/L    Anion gap 8 3.0 - 18 mmol/L    Glucose 118 (H) 74 - 99 mg/dL    BUN 18 7.0 - 18 MG/DL    Creatinine 1.02 0.6 - 1.3 MG/DL    BUN/Creatinine ratio 18 12 - 20      GFR est AA >60 >60 ml/min/1.73m2    GFR est non-AA >60 >60 ml/min/1.73m2    Calcium 7.9 (L) 8.5 - 10.1 MG/DL    Bilirubin, total 0.6 0.2 - 1.0 MG/DL    ALT (SGPT) 66 (H) 16 - 61 U/L    AST (SGOT) 76 (H) 10 - 38 U/L    Alk.  phosphatase 59 45 - 117 U/L    Protein, total 7.0 6.4 - 8.2 g/dL    Albumin 2.8 (L) 3.4 - 5.0 g/dL    Globulin 4.2 (H) 2.0 - 4.0 g/dL    A-G Ratio 0.7 (L) 0.8 - 1.7     GLUCOSE, POC    Collection Time: 04/07/21  6:25 AM   Result Value Ref Range    Glucose (POC) 165 (H) 70 - 110 mg/dL   GLUCOSE, POC    Collection Time: 04/07/21 11:45 AM   Result Value Ref Range    Glucose (POC) 108 70 - 110 mg/dL         Assessment/Plan:     ASSESSMENT:    1. Hypoxia and fever due to #2  2. COVID-19 pneumonia  3. Elevated inflammatory markers due to #2  4. Chronic mild intermittent asthma with exacerbation  5. Tobacco use disorder    PLAN:    Continue oxygen and monitor saturation  We will check room air sats in the morning  Will increase the dose of dexamethasone and add Symbicort for #4  Continue Tylenol as needed for fever  We will add NicoDerm patch for #5  Continue vitamin supplements  We will continue monitoring inflammatory biomarkers  Discussed with ID on-call: No need for remdesivir as patient has been positive for Covid for more than 8 days now. Possible discharge in 1 or 2 days depending on his clinical course    Total time to take care of this patient was 35 minutes and more than 50% of time was spent counseling and coordinating care. Disclaimer: Sections of this note are dictated using utilizing voice recognition software, which may have resulted in some phonetic based errors in grammar and contents. Even though attempts were made to correct all the mistakes, some may have been missed, and remained in the body of the document. If questions arise, please contact our department.

## 2021-04-08 LAB
ALBUMIN SERPL-MCNC: 2.8 G/DL (ref 3.4–5)
ALBUMIN/GLOB SERPL: 0.6 {RATIO} (ref 0.8–1.7)
ALP SERPL-CCNC: 60 U/L (ref 45–117)
ALT SERPL-CCNC: 78 U/L (ref 16–61)
ANION GAP SERPL CALC-SCNC: 4 MMOL/L (ref 3–18)
AST SERPL-CCNC: 79 U/L (ref 10–38)
ATRIAL RATE: 96 BPM
ATRIAL RATE: 99 BPM
BASOPHILS # BLD: 0 K/UL (ref 0–0.1)
BASOPHILS NFR BLD: 0 % (ref 0–2)
BILIRUB SERPL-MCNC: 0.6 MG/DL (ref 0.2–1)
BUN SERPL-MCNC: 16 MG/DL (ref 7–18)
BUN/CREAT SERPL: 15 (ref 12–20)
CALCIUM SERPL-MCNC: 8.7 MG/DL (ref 8.5–10.1)
CALCULATED P AXIS, ECG09: 48 DEGREES
CALCULATED P AXIS, ECG09: 77 DEGREES
CALCULATED R AXIS, ECG10: 34 DEGREES
CALCULATED R AXIS, ECG10: 34 DEGREES
CALCULATED T AXIS, ECG11: 38 DEGREES
CALCULATED T AXIS, ECG11: 43 DEGREES
CHLORIDE SERPL-SCNC: 101 MMOL/L (ref 100–111)
CO2 SERPL-SCNC: 28 MMOL/L (ref 21–32)
CREAT SERPL-MCNC: 1.05 MG/DL (ref 0.6–1.3)
CRP SERPL-MCNC: 11.6 MG/DL (ref 0–0.3)
D DIMER PPP FEU-MCNC: 1.92 UG/ML(FEU)
DIAGNOSIS, 93000: NORMAL
DIAGNOSIS, 93000: NORMAL
DIFFERENTIAL METHOD BLD: ABNORMAL
EOSINOPHIL # BLD: 0 K/UL (ref 0–0.4)
EOSINOPHIL NFR BLD: 0 % (ref 0–5)
ERYTHROCYTE [DISTWIDTH] IN BLOOD BY AUTOMATED COUNT: 12.8 % (ref 11.6–14.5)
GLOBULIN SER CALC-MCNC: 4.7 G/DL (ref 2–4)
GLUCOSE BLD STRIP.AUTO-MCNC: 120 MG/DL (ref 70–110)
GLUCOSE BLD STRIP.AUTO-MCNC: 125 MG/DL (ref 70–110)
GLUCOSE BLD STRIP.AUTO-MCNC: 183 MG/DL (ref 70–110)
GLUCOSE BLD STRIP.AUTO-MCNC: 198 MG/DL (ref 70–110)
GLUCOSE SERPL-MCNC: 132 MG/DL (ref 74–99)
HCT VFR BLD AUTO: 40.2 % (ref 36–48)
HGB BLD-MCNC: 14.3 G/DL (ref 13–16)
LYMPHOCYTES # BLD: 0.3 K/UL (ref 0.9–3.6)
LYMPHOCYTES NFR BLD: 4 % (ref 21–52)
MCH RBC QN AUTO: 31.4 PG (ref 24–34)
MCHC RBC AUTO-ENTMCNC: 35.6 G/DL (ref 31–37)
MCV RBC AUTO: 88.2 FL (ref 74–97)
MONOCYTES # BLD: 0.5 K/UL (ref 0.05–1.2)
MONOCYTES NFR BLD: 6 % (ref 3–10)
NEUTS SEG # BLD: 7.5 K/UL (ref 1.8–8)
NEUTS SEG NFR BLD: 90 % (ref 40–73)
P-R INTERVAL, ECG05: 136 MS
P-R INTERVAL, ECG05: 142 MS
PLATELET # BLD AUTO: 298 K/UL (ref 135–420)
PLATELET COMMENTS,PCOM: ABNORMAL
PMV BLD AUTO: 9.4 FL (ref 9.2–11.8)
POTASSIUM SERPL-SCNC: 4.6 MMOL/L (ref 3.5–5.5)
PROT SERPL-MCNC: 7.5 G/DL (ref 6.4–8.2)
Q-T INTERVAL, ECG07: 342 MS
Q-T INTERVAL, ECG07: 350 MS
QRS DURATION, ECG06: 76 MS
QRS DURATION, ECG06: 76 MS
QTC CALCULATION (BEZET), ECG08: 438 MS
QTC CALCULATION (BEZET), ECG08: 442 MS
RBC # BLD AUTO: 4.56 M/UL (ref 4.35–5.65)
RBC MORPH BLD: ABNORMAL
SODIUM SERPL-SCNC: 133 MMOL/L (ref 136–145)
VENTRICULAR RATE, ECG03: 96 BPM
VENTRICULAR RATE, ECG03: 99 BPM
WBC # BLD AUTO: 8.3 K/UL (ref 4.6–13.2)

## 2021-04-08 PROCEDURE — 65660000000 HC RM CCU STEPDOWN

## 2021-04-08 PROCEDURE — 74011250637 HC RX REV CODE- 250/637: Performed by: INTERNAL MEDICINE

## 2021-04-08 PROCEDURE — 85379 FIBRIN DEGRADATION QUANT: CPT

## 2021-04-08 PROCEDURE — 85025 COMPLETE CBC W/AUTO DIFF WBC: CPT

## 2021-04-08 PROCEDURE — 77010033678 HC OXYGEN DAILY

## 2021-04-08 PROCEDURE — 74011250637 HC RX REV CODE- 250/637: Performed by: FAMILY MEDICINE

## 2021-04-08 PROCEDURE — 86140 C-REACTIVE PROTEIN: CPT

## 2021-04-08 PROCEDURE — 2709999900 HC NON-CHARGEABLE SUPPLY

## 2021-04-08 PROCEDURE — 74011250636 HC RX REV CODE- 250/636: Performed by: INTERNAL MEDICINE

## 2021-04-08 PROCEDURE — 80053 COMPREHEN METABOLIC PANEL: CPT

## 2021-04-08 PROCEDURE — 99232 SBSQ HOSP IP/OBS MODERATE 35: CPT | Performed by: INTERNAL MEDICINE

## 2021-04-08 PROCEDURE — 82962 GLUCOSE BLOOD TEST: CPT

## 2021-04-08 PROCEDURE — 94760 N-INVAS EAR/PLS OXIMETRY 1: CPT

## 2021-04-08 PROCEDURE — 94640 AIRWAY INHALATION TREATMENT: CPT

## 2021-04-08 PROCEDURE — 74011636637 HC RX REV CODE- 636/637: Performed by: NURSE PRACTITIONER

## 2021-04-08 PROCEDURE — 74011250637 HC RX REV CODE- 250/637: Performed by: NURSE PRACTITIONER

## 2021-04-08 PROCEDURE — 36415 COLL VENOUS BLD VENIPUNCTURE: CPT

## 2021-04-08 RX ORDER — FACIAL-BODY WIPES
10 EACH TOPICAL
Status: ACTIVE | OUTPATIENT
Start: 2021-04-08 | End: 2021-04-09

## 2021-04-08 RX ORDER — ADHESIVE BANDAGE
30 BANDAGE TOPICAL
Status: DISCONTINUED | OUTPATIENT
Start: 2021-04-08 | End: 2021-04-08 | Stop reason: SDUPTHER

## 2021-04-08 RX ORDER — ADHESIVE BANDAGE
30 BANDAGE TOPICAL DAILY PRN
Status: DISCONTINUED | OUTPATIENT
Start: 2021-04-08 | End: 2021-04-09 | Stop reason: HOSPADM

## 2021-04-08 RX ORDER — ENOXAPARIN SODIUM 100 MG/ML
40 INJECTION SUBCUTANEOUS EVERY 12 HOURS
Status: DISCONTINUED | OUTPATIENT
Start: 2021-04-08 | End: 2021-04-09 | Stop reason: HOSPADM

## 2021-04-08 RX ORDER — CEFPODOXIME PROXETIL 200 MG/1
200 TABLET, FILM COATED ORAL EVERY 12 HOURS
Status: DISCONTINUED | OUTPATIENT
Start: 2021-04-08 | End: 2021-04-09 | Stop reason: HOSPADM

## 2021-04-08 RX ADMIN — Medication 10 ML: at 21:23

## 2021-04-08 RX ADMIN — Medication 5 MG: at 21:21

## 2021-04-08 RX ADMIN — CEFPODOXIME PROXETIL 200 MG: 200 TABLET, FILM COATED ORAL at 21:22

## 2021-04-08 RX ADMIN — Medication 5 ML: at 15:00

## 2021-04-08 RX ADMIN — GUAIFENESIN 600 MG: 600 TABLET, EXTENDED RELEASE ORAL at 21:22

## 2021-04-08 RX ADMIN — OMEGA-3-ACID ETHYL ESTERS 1000 MG: 1 CAPSULE, LIQUID FILLED ORAL at 17:01

## 2021-04-08 RX ADMIN — IPRATROPIUM BROMIDE AND ALBUTEROL 1 PUFF: 20; 100 SPRAY, METERED RESPIRATORY (INHALATION) at 21:30

## 2021-04-08 RX ADMIN — IPRATROPIUM BROMIDE AND ALBUTEROL 1 PUFF: 20; 100 SPRAY, METERED RESPIRATORY (INHALATION) at 08:14

## 2021-04-08 RX ADMIN — CEFPODOXIME PROXETIL 200 MG: 200 TABLET, FILM COATED ORAL at 15:26

## 2021-04-08 RX ADMIN — DEXAMETHASONE SODIUM PHOSPHATE 6 MG: 4 INJECTION, SOLUTION INTRAMUSCULAR; INTRAVENOUS at 09:04

## 2021-04-08 RX ADMIN — POLYETHYLENE GLYCOL 3350 17 G: 17 POWDER, FOR SOLUTION ORAL at 12:06

## 2021-04-08 RX ADMIN — GUAIFENESIN 600 MG: 600 TABLET, EXTENDED RELEASE ORAL at 09:04

## 2021-04-08 RX ADMIN — MAGNESIUM HYDROXIDE 30 ML: 400 SUSPENSION ORAL at 16:58

## 2021-04-08 RX ADMIN — Medication 5000 UNITS: at 09:04

## 2021-04-08 RX ADMIN — ACETAMINOPHEN 650 MG: 325 TABLET ORAL at 15:48

## 2021-04-08 RX ADMIN — Medication 500 MG: at 21:21

## 2021-04-08 RX ADMIN — ACETAMINOPHEN 650 MG: 325 TABLET ORAL at 09:16

## 2021-04-08 RX ADMIN — IPRATROPIUM BROMIDE AND ALBUTEROL 1 PUFF: 20; 100 SPRAY, METERED RESPIRATORY (INHALATION) at 13:21

## 2021-04-08 RX ADMIN — DEXAMETHASONE SODIUM PHOSPHATE 6 MG: 4 INJECTION, SOLUTION INTRAMUSCULAR; INTRAVENOUS at 21:21

## 2021-04-08 RX ADMIN — Medication 8 ML: at 09:00

## 2021-04-08 RX ADMIN — INSULIN LISPRO 2 UNITS: 100 INJECTION, SOLUTION INTRAVENOUS; SUBCUTANEOUS at 21:22

## 2021-04-08 RX ADMIN — Medication 500 MG: at 09:03

## 2021-04-08 RX ADMIN — OMEGA-3-ACID ETHYL ESTERS 1000 MG: 1 CAPSULE, LIQUID FILLED ORAL at 09:03

## 2021-04-08 RX ADMIN — INSULIN LISPRO 2 UNITS: 100 INJECTION, SOLUTION INTRAVENOUS; SUBCUTANEOUS at 16:29

## 2021-04-08 RX ADMIN — BUDESONIDE AND FORMOTEROL FUMARATE DIHYDRATE 2 PUFF: 160; 4.5 AEROSOL RESPIRATORY (INHALATION) at 21:30

## 2021-04-08 RX ADMIN — ZINC SULFATE 220 MG (50 MG) CAPSULE 1 CAPSULE: CAPSULE at 09:04

## 2021-04-08 RX ADMIN — BUDESONIDE AND FORMOTEROL FUMARATE DIHYDRATE 2 PUFF: 160; 4.5 AEROSOL RESPIRATORY (INHALATION) at 08:14

## 2021-04-08 NOTE — PROGRESS NOTES
conducted an initial consultation and Spiritual Assessment for [facility], who is a 48 y.o.,male. Patient's Primary Language is: [place]. According to the patient's EMR Mandaeism Affiliation is: Yazidi. The reason the Patient came to the hospital is:   Patient Active Problem List    Diagnosis Date Noted    Hypoxia 04/06/2021    Fever 04/06/2021    COVID-19 04/06/2021        The  provided the following Interventions:  Initiated a relationship of care and support. Listened empathetically. Provided chaplaincy education. Offered to read \"The Opening\" but the patient declined. Offered assurance of continued prayers on patient's behalf. Chart reviewed. The following outcomes where achieved:   confirmed Patient's Mandaeism Affiliation as Yazidi  Patient expressed gratitude for 's visit. Assessment:  Patient does not have any known Church/cultural needs that will affect patient's preferences in health care. There are no known spiritual or Church issues which require intervention at this time. Plan:  Chaplains will continue to follow and will provide pastoral care as needed and as requested. [address], [place].  [id number]   [phone]

## 2021-04-08 NOTE — PROGRESS NOTES
Reason for Admission:  COVID-19 [U07.1]  Hypoxia [R09.02]  Fever [R50.9]                 RUR Score:    11            Plan for utilizing home health:    no                      Likelihood of Readmission:   LOW                         Transition of Care Plan:              Initial assessment completed with patient. Cognitive status of patient: oriented to time, place, person and situation. Face sheet information confirmed:  yes. The patient designates daughter to participate in his discharge plan and to receive any needed information. This patient lives in a single family home with patient and friend. Patient is able to navigate steps as needed. Prior to hospitalization, patient was considered to be independent with ADLs/IADLS : yes . Patient has a current ACP document on file: no      Healthcare Decision Maker:     Click here to complete [place] including selection of the Healthcare Decision Maker Relationship (ie \"Primary\")    The patient and friend will be available to transport patient home upon discharge. The patient already has none reported,  medical equipment available in the home. Patient is not currently active with home health. Patient has not stayed in a skilled nursing facility or rehab. Was  stay within last 60 days : no. This patient is on dialysis :no      Freedom of choice signed: no. Currently, the discharge plan is Home. The patient states that he can obtain his medications from the pharmacy, and take his medications as directed. Patient's current insurance is Medicaid pending       Care Management Interventions  PCP Verified by CM: Yes  Mode of Transport at Discharge: Self  Physical Therapy Consult: Yes  Occupational Therapy Consult: Yes  Current Support Network:  Other(friend)  Confirm Follow Up Transport: Family  The Plan for Transition of Care is Related to the Following Treatment Goals : home  Discharge Location  Discharge Placement: [address], RN BSN  Care Manager  [phone]

## 2021-04-08 NOTE — PROGRESS NOTES
Hospitalist Progress Note    Subjective:   Daily Progress Note: 4/8/2021     Feeling much better today. States shortness of breath is much better. Dyspnea on exertion is present. Denies any chest pain or abdominal pain. Productive cough present. No nausea or vomiting. No headaches or dizziness.     Current Facility-Administered Medications   Medication Dose Route Frequency    cefpodoxime (VANTIN) tablet 200 mg  200 mg Oral Q12H    ipratropium-albuterol (COMBIVENT RESPIMAT) 20 mcg-100 mcg inhalation spray  1 Puff Inhalation TID RT    dexamethasone (DECADRON) 4 mg/mL injection 6 mg  6 mg IntraVENous Q12H    cholecalciferol (VITAMIN D3) capsule 5,000 Units  5,000 Units Oral DAILY    budesonide-formoteroL (SYMBICORT) 160-4.5 mcg/actuation HFA inhaler 2 Puff  2 Puff Inhalation BID RT    omega-3 acid ethyl esters (LOVAZA) capsule 1,000 mg  1 g Oral BID WITH MEALS    melatonin (rapid dissolve) tablet 5 mg  5 mg Oral QHS    nicotine (NICODERM CQ) 14 mg/24 hr patch 1 Patch  1 Patch TransDERmal DAILY    sodium chloride (NS) flush 5-10 mL  5-10 mL IntraVENous PRN    sodium chloride (NS) flush 5-40 mL  5-40 mL IntraVENous Q8H    sodium chloride (NS) flush 5-40 mL  5-40 mL IntraVENous PRN    acetaminophen (TYLENOL) tablet 650 mg  650 mg Oral Q6H PRN    Or    acetaminophen (TYLENOL) suppository 650 mg  650 mg Rectal Q6H PRN    polyethylene glycol (MIRALAX) packet 17 g  17 g Oral DAILY PRN    promethazine (PHENERGAN) tablet 12.5 mg  12.5 mg Oral Q6H PRN    Or    ondansetron (ZOFRAN) injection 4 mg  4 mg IntraVENous Q6H PRN    guaiFENesin-dextromethorphan (ROBITUSSIN DM) 100-10 mg/5 mL syrup 5 mL  5 mL Oral Q4H PRN    enoxaparin (LOVENOX) injection 40 mg  40 mg SubCUTAneous Q24H    ascorbic acid (vitamin C) (VITAMIN C) tablet 500 mg  500 mg Oral BID    zinc sulfate (ZINCATE) 50 mg zinc (220 mg) capsule 1 Cap  1 Cap Oral DAILY    insulin lispro (HUMALOG) injection   SubCUTAneous AC&HS    glucose chewable tablet 16 g  4 Tab Oral PRN    glucagon (GLUCAGEN) injection 1 mg  1 mg IntraMUSCular PRN    dextrose (D50W) injection syrg 12.5-25 g  25-50 mL IntraVENous PRN    guaiFENesin ER (MUCINEX) tablet 600 mg  600 mg Oral Q12H        Review of Systems  Pertinent items are noted in HPI.     Objective:     Visit Vitals  /75   Pulse 86   Temp 98.2 °F (36.8 °C)   Resp 22   Ht 5' 6\" (1.676 m)   Wt 107.6 kg (237 lb 3.2 oz)   SpO2 95%   BMI 38.29 kg/m²    O2 Flow Rate (L/min): 2 l/min O2 Device: Nasal cannula    Temp (24hrs), Av.1 °F (37.3 °C), Min:97.2 °F (36.2 °C), Max:101.7 °F (38.7 °C)      701 - 1900  In: -   Out: 271 [WTMVT:886]  1901 - 700  In: 1020 [P.O.:1020]  Out: 1501 [Urine:2275]    General appearance - alert, well appearing, and in no distress  Chest -clear air entry noted in bases, no wheezes currently  Heart - S1 and S2 normal  Abdomen - soft, nontender, nondistended, Bowel sounds present  Neurological - alert, oriented, normal speech, no focal findings noted  Extremities - no pedal edema noted    Data Review    Recent Results (from the past 24 hour(s))   GLUCOSE, POC    Collection Time: 21  4:01 PM   Result Value Ref Range    Glucose (POC) 146 (H) 70 - 110 mg/dL   GLUCOSE, POC    Collection Time: 21  8:53 PM   Result Value Ref Range    Glucose (POC) 132 (H) 70 - 110 mg/dL   D DIMER    Collection Time: 21  4:07 AM   Result Value Ref Range    D DIMER 1.92 (H) <0.46 ug/ml(FEU)   C REACTIVE PROTEIN, QT    Collection Time: 21  4:07 AM   Result Value Ref Range    C-Reactive protein 11.6 (H) 0 - 0.3 mg/dL   CBC WITH AUTOMATED DIFF    Collection Time: 21  4:07 AM   Result Value Ref Range    WBC 8.3 4.6 - 13.2 K/uL    RBC 4.56 4.35 - 5.65 M/uL    HGB 14.3 13.0 - 16.0 g/dL    HCT 40.2 36.0 - 48.0 %    MCV 88.2 74.0 - 97.0 FL    MCH 31.4 24.0 - 34.0 PG    MCHC 35.6 31.0 - 37.0 g/dL    RDW 12.8 11.6 - 14.5 %    PLATELET 403 830 - 937 K/uL    MPV 9.4 9.2 - 11.8 FL    NEUTROPHILS 90 (H) 40 - 73 %    LYMPHOCYTES 4 (L) 21 - 52 %    MONOCYTES 6 3 - 10 %    EOSINOPHILS 0 0 - 5 %    BASOPHILS 0 0 - 2 %    ABS. NEUTROPHILS 7.5 1.8 - 8.0 K/UL    ABS. LYMPHOCYTES 0.3 (L) 0.9 - 3.6 K/UL    ABS. MONOCYTES 0.5 0.05 - 1.2 K/UL    ABS. EOSINOPHILS 0.0 0.0 - 0.4 K/UL    ABS. BASOPHILS 0.0 0.0 - 0.1 K/UL    DF MANUAL      PLATELET COMMENTS ADEQUATE PLATELETS      RBC COMMENTS TEARDROP CELLS  1+       METABOLIC PANEL, COMPREHENSIVE    Collection Time: 04/08/21  4:07 AM   Result Value Ref Range    Sodium 133 (L) 136 - 145 mmol/L    Potassium 4.6 3.5 - 5.5 mmol/L    Chloride 101 100 - 111 mmol/L    CO2 28 21 - 32 mmol/L    Anion gap 4 3.0 - 18 mmol/L    Glucose 132 (H) 74 - 99 mg/dL    BUN 16 7.0 - 18 MG/DL    Creatinine 1.05 0.6 - 1.3 MG/DL    BUN/Creatinine ratio 15 12 - 20      GFR est AA >60 >60 ml/min/1.73m2    GFR est non-AA >60 >60 ml/min/1.73m2    Calcium 8.7 8.5 - 10.1 MG/DL    Bilirubin, total 0.6 0.2 - 1.0 MG/DL    ALT (SGPT) 78 (H) 16 - 61 U/L    AST (SGOT) 79 (H) 10 - 38 U/L    Alk. phosphatase 60 45 - 117 U/L    Protein, total 7.5 6.4 - 8.2 g/dL    Albumin 2.8 (L) 3.4 - 5.0 g/dL    Globulin 4.7 (H) 2.0 - 4.0 g/dL    A-G Ratio 0.6 (L) 0.8 - 1.7     GLUCOSE, POC    Collection Time: 04/08/21  6:17 AM   Result Value Ref Range    Glucose (POC) 120 (H) 70 - 110 mg/dL   GLUCOSE, POC    Collection Time: 04/08/21 12:09 PM   Result Value Ref Range    Glucose (POC) 125 (H) 70 - 110 mg/dL         Assessment/Plan:     ASSESSMENT:    1. Hypoxia and fever due to #2  2. COVID-19 pneumonia  3. Elevated inflammatory markers due to #2  4. Chronic mild intermittent asthma with exacerbation  5.   Tobacco use disorder    PLAN:    Continue oxygen and monitor saturation  Advised nurses to check patient for home oxygen  We will start patient on Vantin as patient has some productive cough  Continue current dose of dexamethasone and Symbicort  Continue Tylenol as needed for fever  Negative blood culture x2  Continue NicoDerm patch for #5  Continue vitamin supplements  We will continue monitoring inflammatory biomarkers  Possible discharge home tomorrow if remains stable overnight    Total time to take care of this patient was 30 minutes and more than 50% of time was spent counseling and coordinating care. Disclaimer: Sections of this note are dictated using utilizing voice recognition software, which may have resulted in some phonetic based errors in grammar and contents. Even though attempts were made to correct all the mistakes, some may have been missed, and remained in the body of the document. If questions arise, please contact our department.

## 2021-04-08 NOTE — ROUTINE PROCESS
Bedside shift change report given to Kenna Kawasaki, RN (oncoming nurse) by HORACE Wolfe RN (offgoing nurse). Report included the following information SBAR, Kardex, MAR and Recent Results.

## 2021-04-09 ENCOUNTER — HOME HEALTH ADMISSION (OUTPATIENT)
Dept: HOME HEALTH SERVICES | Facility: HOME HEALTH | Age: 51
End: 2021-04-09
Payer: MEDICAID

## 2021-04-09 VITALS
WEIGHT: 238 LBS | OXYGEN SATURATION: 91 % | SYSTOLIC BLOOD PRESSURE: 128 MMHG | HEART RATE: 94 BPM | DIASTOLIC BLOOD PRESSURE: 86 MMHG | BODY MASS INDEX: 38.25 KG/M2 | RESPIRATION RATE: 18 BRPM | TEMPERATURE: 98.5 F | HEIGHT: 66 IN

## 2021-04-09 LAB
ALBUMIN SERPL-MCNC: 2.7 G/DL (ref 3.4–5)
ALBUMIN/GLOB SERPL: 0.7 {RATIO} (ref 0.8–1.7)
ALP SERPL-CCNC: 63 U/L (ref 45–117)
ALT SERPL-CCNC: 87 U/L (ref 16–61)
ANION GAP SERPL CALC-SCNC: 6 MMOL/L (ref 3–18)
AST SERPL-CCNC: 71 U/L (ref 10–38)
BASOPHILS # BLD: 0 K/UL (ref 0–0.1)
BASOPHILS NFR BLD: 0 % (ref 0–2)
BILIRUB SERPL-MCNC: 0.5 MG/DL (ref 0.2–1)
BUN SERPL-MCNC: 19 MG/DL (ref 7–18)
BUN/CREAT SERPL: 22 (ref 12–20)
CALCIUM SERPL-MCNC: 7.8 MG/DL (ref 8.5–10.1)
CHLORIDE SERPL-SCNC: 103 MMOL/L (ref 100–111)
CO2 SERPL-SCNC: 26 MMOL/L (ref 21–32)
CREAT SERPL-MCNC: 0.88 MG/DL (ref 0.6–1.3)
CRP SERPL-MCNC: 7.1 MG/DL (ref 0–0.3)
D DIMER PPP FEU-MCNC: 2.19 UG/ML(FEU)
DIFFERENTIAL METHOD BLD: ABNORMAL
EOSINOPHIL # BLD: 0 K/UL (ref 0–0.4)
EOSINOPHIL NFR BLD: 0 % (ref 0–5)
ERYTHROCYTE [DISTWIDTH] IN BLOOD BY AUTOMATED COUNT: 12.6 % (ref 11.6–14.5)
GLOBULIN SER CALC-MCNC: 4.1 G/DL (ref 2–4)
GLUCOSE BLD STRIP.AUTO-MCNC: 140 MG/DL (ref 70–110)
GLUCOSE SERPL-MCNC: 150 MG/DL (ref 74–99)
HCT VFR BLD AUTO: 39.5 % (ref 36–48)
HGB BLD-MCNC: 13.7 G/DL (ref 13–16)
LYMPHOCYTES # BLD: 0.7 K/UL (ref 0.9–3.6)
LYMPHOCYTES NFR BLD: 10 % (ref 21–52)
MCH RBC QN AUTO: 30.6 PG (ref 24–34)
MCHC RBC AUTO-ENTMCNC: 34.7 G/DL (ref 31–37)
MCV RBC AUTO: 88.4 FL (ref 74–97)
MONOCYTES # BLD: 0.1 K/UL (ref 0.05–1.2)
MONOCYTES NFR BLD: 1 % (ref 3–10)
MYELOCYTES NFR BLD MANUAL: 1 %
NEUTS SEG # BLD: 5.4 K/UL (ref 1.8–8)
NEUTS SEG NFR BLD: 83 % (ref 40–73)
OTHER CELLS NFR BLD MANUAL: 5 %
PLATELET # BLD AUTO: 338 K/UL (ref 135–420)
PLATELET COMMENTS,PCOM: ABNORMAL
PMV BLD AUTO: 9.3 FL (ref 9.2–11.8)
POTASSIUM SERPL-SCNC: 4.8 MMOL/L (ref 3.5–5.5)
PROT SERPL-MCNC: 6.8 G/DL (ref 6.4–8.2)
RBC # BLD AUTO: 4.47 M/UL (ref 4.35–5.65)
RBC MORPH BLD: ABNORMAL
SODIUM SERPL-SCNC: 135 MMOL/L (ref 136–145)
WBC # BLD AUTO: 6.5 K/UL (ref 4.6–13.2)

## 2021-04-09 PROCEDURE — 86140 C-REACTIVE PROTEIN: CPT

## 2021-04-09 PROCEDURE — 74011250637 HC RX REV CODE- 250/637: Performed by: NURSE PRACTITIONER

## 2021-04-09 PROCEDURE — 74011250637 HC RX REV CODE- 250/637: Performed by: INTERNAL MEDICINE

## 2021-04-09 PROCEDURE — 85379 FIBRIN DEGRADATION QUANT: CPT

## 2021-04-09 PROCEDURE — 99239 HOSP IP/OBS DSCHRG MGMT >30: CPT | Performed by: INTERNAL MEDICINE

## 2021-04-09 PROCEDURE — 80053 COMPREHEN METABOLIC PANEL: CPT

## 2021-04-09 PROCEDURE — 82962 GLUCOSE BLOOD TEST: CPT

## 2021-04-09 PROCEDURE — APPSS60 APP SPLIT SHARED TIME 46-60 MINUTES: Performed by: PHYSICIAN ASSISTANT

## 2021-04-09 PROCEDURE — 77010033678 HC OXYGEN DAILY

## 2021-04-09 PROCEDURE — 85025 COMPLETE CBC W/AUTO DIFF WBC: CPT

## 2021-04-09 PROCEDURE — 36415 COLL VENOUS BLD VENIPUNCTURE: CPT

## 2021-04-09 PROCEDURE — 74011250636 HC RX REV CODE- 250/636: Performed by: INTERNAL MEDICINE

## 2021-04-09 PROCEDURE — 94760 N-INVAS EAR/PLS OXIMETRY 1: CPT

## 2021-04-09 PROCEDURE — 94640 AIRWAY INHALATION TREATMENT: CPT

## 2021-04-09 PROCEDURE — 74011250637 HC RX REV CODE- 250/637: Performed by: FAMILY MEDICINE

## 2021-04-09 RX ORDER — ZINC SULFATE 50(220)MG
50 CAPSULE ORAL DAILY
Qty: 14 CAP | Refills: 0 | Status: SHIPPED | OUTPATIENT
Start: 2021-04-10

## 2021-04-09 RX ORDER — CALCIUM CARB/MAGNESIUM CARB 311-232MG
5 TABLET ORAL
Qty: 14 TAB | Refills: 0 | Status: SHIPPED | OUTPATIENT
Start: 2021-04-09

## 2021-04-09 RX ORDER — DEXAMETHASONE 4 MG/1
TABLET ORAL
Qty: 15 TAB | Refills: 0 | Status: SHIPPED | OUTPATIENT
Start: 2021-04-09 | End: 2021-04-14 | Stop reason: ALTCHOICE

## 2021-04-09 RX ORDER — CEFPODOXIME PROXETIL 200 MG/1
200 TABLET, FILM COATED ORAL EVERY 12 HOURS
Qty: 10 TAB | Refills: 0 | Status: SHIPPED | OUTPATIENT
Start: 2021-04-09

## 2021-04-09 RX ORDER — GUAIFENESIN 600 MG/1
600 TABLET, EXTENDED RELEASE ORAL EVERY 12 HOURS
Qty: 28 TAB | Refills: 0 | Status: SHIPPED | OUTPATIENT
Start: 2021-04-09 | End: 2021-04-23

## 2021-04-09 RX ORDER — GUAIFENESIN 600 MG/1
600 TABLET, EXTENDED RELEASE ORAL EVERY 12 HOURS
Qty: 28 TAB | Refills: 0 | Status: SHIPPED | OUTPATIENT
Start: 2021-04-09 | End: 2021-04-09

## 2021-04-09 RX ORDER — CALCIUM CARB/MAGNESIUM CARB 311-232MG
5 TABLET ORAL
Qty: 14 TAB | Refills: 0 | Status: SHIPPED | OUTPATIENT
Start: 2021-04-09 | End: 2021-04-09

## 2021-04-09 RX ORDER — BUDESONIDE AND FORMOTEROL FUMARATE DIHYDRATE 160; 4.5 UG/1; UG/1
2 AEROSOL RESPIRATORY (INHALATION) 2 TIMES DAILY
Qty: 1 INHALER | Refills: 0 | Status: SHIPPED | OUTPATIENT
Start: 2021-04-09 | End: 2021-04-09

## 2021-04-09 RX ORDER — CHOLECALCIFEROL (VITAMIN D3) 125 MCG
5000 CAPSULE ORAL DAILY
Qty: 15 CAP | Refills: 0 | Status: SHIPPED | OUTPATIENT
Start: 2021-04-10 | End: 2021-04-24

## 2021-04-09 RX ORDER — ASCORBIC ACID 500 MG
500 TABLET ORAL 2 TIMES DAILY
Qty: 14 TAB | Refills: 0 | Status: SHIPPED | OUTPATIENT
Start: 2021-04-09 | End: 2021-04-09

## 2021-04-09 RX ORDER — OMEGA-3-ACID ETHYL ESTERS 1 G/1
1 CAPSULE, LIQUID FILLED ORAL 2 TIMES DAILY WITH MEALS
Qty: 20 CAP | Refills: 0 | Status: SHIPPED | OUTPATIENT
Start: 2021-04-09

## 2021-04-09 RX ORDER — BUDESONIDE AND FORMOTEROL FUMARATE DIHYDRATE 160; 4.5 UG/1; UG/1
2 AEROSOL RESPIRATORY (INHALATION) 2 TIMES DAILY
Qty: 1 INHALER | Refills: 0 | Status: SHIPPED | OUTPATIENT
Start: 2021-04-09

## 2021-04-09 RX ORDER — CEFPODOXIME PROXETIL 200 MG/1
200 TABLET, FILM COATED ORAL EVERY 12 HOURS
Qty: 10 TAB | Refills: 0 | Status: SHIPPED | OUTPATIENT
Start: 2021-04-09 | End: 2021-04-09

## 2021-04-09 RX ORDER — ASCORBIC ACID 500 MG
500 TABLET ORAL 2 TIMES DAILY
Qty: 14 TAB | Refills: 0 | Status: SHIPPED | OUTPATIENT
Start: 2021-04-09

## 2021-04-09 RX ORDER — CHOLECALCIFEROL (VITAMIN D3) 125 MCG
5000 CAPSULE ORAL DAILY
Qty: 15 CAP | Refills: 0 | Status: SHIPPED | OUTPATIENT
Start: 2021-04-10 | End: 2021-04-09

## 2021-04-09 RX ADMIN — ZINC SULFATE 220 MG (50 MG) CAPSULE 1 CAPSULE: CAPSULE at 10:29

## 2021-04-09 RX ADMIN — Medication 5000 UNITS: at 10:29

## 2021-04-09 RX ADMIN — BUDESONIDE AND FORMOTEROL FUMARATE DIHYDRATE 2 PUFF: 160; 4.5 AEROSOL RESPIRATORY (INHALATION) at 10:07

## 2021-04-09 RX ADMIN — ENOXAPARIN SODIUM 40 MG: 40 INJECTION SUBCUTANEOUS at 02:06

## 2021-04-09 RX ADMIN — Medication 5 ML: at 09:00

## 2021-04-09 RX ADMIN — IPRATROPIUM BROMIDE AND ALBUTEROL 1 PUFF: 20; 100 SPRAY, METERED RESPIRATORY (INHALATION) at 14:28

## 2021-04-09 RX ADMIN — OMEGA-3-ACID ETHYL ESTERS 1000 MG: 1 CAPSULE, LIQUID FILLED ORAL at 10:29

## 2021-04-09 RX ADMIN — GUAIFENESIN 600 MG: 600 TABLET, EXTENDED RELEASE ORAL at 10:29

## 2021-04-09 RX ADMIN — IPRATROPIUM BROMIDE AND ALBUTEROL 1 PUFF: 20; 100 SPRAY, METERED RESPIRATORY (INHALATION) at 10:07

## 2021-04-09 RX ADMIN — DEXAMETHASONE SODIUM PHOSPHATE 6 MG: 4 INJECTION, SOLUTION INTRAMUSCULAR; INTRAVENOUS at 10:29

## 2021-04-09 RX ADMIN — Medication 500 MG: at 10:29

## 2021-04-09 RX ADMIN — CEFPODOXIME PROXETIL 200 MG: 200 TABLET, FILM COATED ORAL at 10:29

## 2021-04-09 NOTE — DISCHARGE SUMMARY
Discharge Summary    Patient: Kaitlin Perez               Sex: male          DOA: 4/6/2021         YOB: 1970      Age:  48 y.o.        LOS:  LOS: 3 days                Admit Date: 4/6/2021    Discharge Date: 4/9/2021    Admission Diagnoses: COVID-19 [U07.1]  Hypoxia [R09.02]  Fever [R50.9]    Discharge Diagnoses:    Problem List as of 4/9/2021 Never Reviewed          Codes Class Noted - Resolved    Hypoxia ICD-10-CM: R09.02  ICD-9-CM: 799.02  4/6/2021 - Present        Fever ICD-10-CM: R50.9  ICD-9-CM: 780.60  4/6/2021 - Present        COVID-19 ICD-10-CM: U07.1  ICD-9-CM: 079.89  4/6/2021 - Present              Discharge Condition:  Improved    Hospital Course:  original HPI: Ron Farah is a 48 y.o.  male with hx of HTN, asthma, tobacco abuse and recent diagnosis of Covid-19 Pneumonia at [facility] (3/30/2021; discharged on oral abx from ED and Tessalon perles) who presented to the ED today with SOB and headache. Pt reports compliance with meds but does not feel he is improving, also having to use his inhaler more often. Associated symptoms include change in bowel habits and change in taste. He was exposed to Covid-19 by his daughter- she is doing well. No c/o fever/chills, chest pain, palpitations, abdominal pain, N/V.     Pt febrile in the ED with temp of 102., tachycardia, initially oxgen sats 93% on RA but down to 88% with ambulation and placed on 2L NC. CRP 18.7, D-dimer elevated at 3.18 , CTa chest negative for PE but note extensive groundglass lung infiltrates. ID consulted, pt started on IV Ceftriaxone and Zithromax and given Decadon 6 mg IV x 1. He is not a candidate for Remdesivir due to >5 days of dx of Covid-19 Pneumonia. We are asked to admit for further management of care.     Pt is not on oxygen at the time of my evaluation, oxygen sats 91-93% on RA but note ROJAS just with sitting up in bed for physical exam and occasional desat to 88%. \"    Admitted for hypoxic resp failure due to COVID-19 pneumonia. Started on steroids. Dyspnea and SOB improved. Cont' to be hypoxic with exertion- dips into 80's with ambulation, home O2 ordered. Patient seen today and stable for discharge with his home O2. Stating his SOB has improved, very comfortable comparatively to admission. Discussed steroid taper and abx with patient. Recommended OTC ASA on discharge. Consults:    Treatment Team: Attending Provider: Carlos Garcia MD; Hospitalist: Arvin Alcaraz MD; Consulting Provider: Ramakrishna Higgins MD; Care Manager: Rosangela Hines Primary Nurse: Collette Mar    Significant Diagnostic Studies:     Discharge Medications:     Current Discharge Medication List      START taking these medications    Details   zinc sulfate (ZINCATE) 50 mg zinc (220 mg) capsule Take 1 Cap by mouth daily. Qty: 14 Cap, Refills: 0      dexAMETHasone (DECADRON) 4 mg tablet Take 1 tablet by mouth every 12 hours for 5 days. Following this, take 1 tablet by mouth daily for 5 days. Qty: 15 Tab, Refills: 0      omega-3 acid ethyl esters (LOVAZA) 1 gram capsule Take 1 Cap by mouth two (2) times daily (with meals). Qty: 20 Cap, Refills: 0      budesonide-formoteroL (SYMBICORT) 160-4.5 mcg/actuation HFAA Take 2 Puffs by inhalation two (2) times a day. Qty: 1 Inhaler, Refills: 0      ascorbic acid, vitamin C, (VITAMIN C) 500 mg tablet Take 1 Tab by mouth two (2) times a day. Qty: 14 Tab, Refills: 0      cholecalciferol (VITAMIN D3) (5000 Units /125 mcg) capsule Take 1 Cap by mouth daily for 14 days. Qty: 15 Cap, Refills: 0      cefpodoxime (VANTIN) 200 mg tablet Take 1 Tab by mouth every twelve (12) hours. Qty: 10 Tab, Refills: 0      guaiFENesin ER (MUCINEX) 600 mg ER tablet Take 1 Tab by mouth every twelve (12) hours for 14 days.   Qty: 28 Tab, Refills: 0      ipratropium-albuteroL (COMBIVENT RESPIMAT)  mcg/actuation inhaler Take 1 Puff by inhalation every four to six (4-6) hours as needed for Wheezing. Qty: 1 Inhaler, Refills: 0      melatonin, rapid dissolve, 5 mg TbDi tablet Take 1 Tab by mouth nightly.   Qty: 14 Tab, Refills: 0         STOP taking these medications       benzonatate (Tessalon Perles) 100 mg capsule Comments:   Reason for Stopping:         albuterol (PROVENTIL HFA, VENTOLIN HFA, PROAIR HFA) 90 mcg/actuation inhaler Comments:   Reason for Stopping:         azithromycin (ZITHROMAX) 250 mg tablet Comments:   Reason for Stopping:         methocarbamol (ROBAXIN) 500 mg tablet Comments:   Reason for Stopping:               Activity: PT/OT per Home Health    Diet: Resume previous diet    Wound Care: None needed    Follow-up: w/ PCP within 1 week, ED if sx worsen    Michael Wilkinson PA-C  04/09/21

## 2021-04-09 NOTE — PROGRESS NOTES
Faxed oxygen order to [facility]. Called and spoke with [facility], Gisele Vásquez is working on referral and will call CM with delivery time frame. Spoke with patient at bedside he was agreeable to home health and confirmed delivery address as same as address on facesheet. HH order noted and patient agreeable with [facility]. Referral faxed. Patient needs PCP set up prior to dc. Will continue to follow up.     3:12pm Did not receive call from Tina Manjarrez at [facility]. Called [facility] and was transferred to [facility]. Left messaged with  phone number to call back. Will continue to follow up.      Clint De La Rosa RN, BSN   Care Management  [phone]

## 2021-04-09 NOTE — PROGRESS NOTES
Physician Progress Note      Adrienne López  CSN #:                  [id number]  :                       1970  ADMIT DATE:       2021 4:09 PM  [address] [place] DATE:  RESPONDING  PROVIDER #:        Daisy LABOY MD          QUERY TEXT:    Pt admitted with COVID-19 and noted to have SIRS in H&P. If possible, please document in progress notes and discharge summary if you are evaluating and/or treating: The medical record reflects the following:  Risk Factors: + hx COVID 19 and pneumonia  Clinical Indicators: WBC on  was 5.9, on  was 3.6; temp of 102.1; Heart rate 117; resp 22  Treatment:  Droplet plus precautions, Vantin, dexamethasone, was on doxycycline as OP and this was [facility] . Thank you Dr. Wesley Meadows,  Gabriela Medeiros RN, [facility]  Options provided:  -- Sepsis present on admission due to COVID-19 infection  -- Sepsis not present on admission due to COVID-19 infection  -- Sepsis present on admission due to COVID-19 pneumonia  -- Sepsis not present on admission due to COVID-19 pneumonia  -- Covid-19 infection without sepsis  -- Covid-19 pneumonia without sepsis  -- Other - I will add my own diagnosis  -- Disagree - Not applicable / Not valid  -- Disagree - Clinically unable to determine / Unknown  -- Refer to Clinical Documentation Reviewer    PROVIDER RESPONSE TEXT:    This patient has Covid-19 infection without sepsis. Query created by:  Jose M Portillo on 2021 1:19 PM      Electronically signed by:  Daisy Pablo MD 2021 1:23 PM

## 2021-04-09 NOTE — PROGRESS NOTES
Spoke with Tony Dukes at [facility] patient's oxygen equipment to be delivered to [address]. Spoke with patient via phone and notified him that equipment will be delivered by 5pm and that the recipient will need to bring a portable oxygen tank that gets delivered with them to the hospital when they come to  the patient. Discharge order noted for today. Pt has been accepted to [facility] agency. Met with patient and he is agreeable to the transition plan today. Transport has been arranged through patient's friend. Patient's discharge summary and home health  orders have been forwarded to [facility] home health  agency via [address]. Updated bedside RN  to the transition plan.   Discharge information has been documented on the AVS.       Mickey Shen RN, BSN   Care Management  [phone]

## 2021-04-09 NOTE — ROUTINE PROCESS
Bedside and Verbal shift change report given to SELINA Cruz (oncoming nurse) by Lizeth Mendez (offgoing nurse). Report included the following information SBAR, Kardex and MAR.

## 2021-04-09 NOTE — DISCHARGE INSTRUCTIONS
Patient armband removed and shredded  MyChart Activation    Thank you for requesting access to [employer]. Please follow the instructions below to securely access and download your online medical record. [employer] allows you to send messages to your doctor, view your test results, renew your prescriptions, schedule appointments, and more. How Do I Sign Up? 1. In your internet browser, go to [email or web address]  2. Click on the First Time User? Click Here link in the Sign In box. You will be redirect to the New Member Sign Up page. 3. Enter your [employer] Access Code exactly as it appears below. You will not need to use this code after youve completed the sign-up process. If you do not sign up before the expiration date, you must request a new code. [employer] Access Code: Activation code not generated  Current [employer] Status: Active (This is the date your [employer]ner access code will )    4. Enter the last four digits of your Social Security Number (xxxx) and Date of Birth (mm/dd/yyyy) as indicated and click Submit. You will be taken to the next sign-up page. 5. Create a [employer]ner ID. This will be your [employer]ner login ID and cannot be changed, so think of one that is secure and easy to remember. 6. Create a [employer] password. You can change your password at any time. 7. Enter your Password Reset Question and Answer. This can be used at a later time if you forget your password. 8. Enter your e-mail address. You will receive e-mail notification when new information is available in [address] 9. Click Sign Up. You can now view and download portions of your medical record. 10. Click the Download Summary menu link to download a portable copy of your medical information. Additional Information    If you have questions, please visit the Frequently Asked Questions section of the [employer] website at [email or web address] Remember, [employer] is NOT to be used for urgent needs.  For medical emergencies, dial 911. Discharge medications reviewed with patient and appropriate educational materials and side effects teaching were provided. DISCHARGE SUMMARY from Nurse    PATIENT INSTRUCTIONS:    After general anesthesia or intravenous sedation, for 24 hours or while taking prescription Narcotics:  · Limit your activities  · Do not drive and operate hazardous machinery  · Do not make important personal or business decisions  · Do  not drink alcoholic beverages  · If you have not urinated within 8 hours after discharge, please contact your surgeon on call. Report the following to your surgeon:  · Excessive pain, swelling, redness or odor of or around the surgical area  · Temperature over 100.5  · Nausea and vomiting lasting longer than 4 hours or if unable to take medications  · Any signs of decreased circulation or nerve impairment to extremity: change in color, persistent  numbness, tingling, coldness or increase pain  · Any questions    What to do at Home:  Recommended activity: Activity as tolerated,     If you experience any of the following symptoms increasing shortness of breath no relieved with oxygen, please follow up with primary physician. *  Please give a list of your current medications to your Primary Care Provider. *  Please update this list whenever your medications are discontinued, doses are      changed, or new medications (including over-the-counter products) are added. *  Please carry medication information at all times in case of emergency situations. These are general instructions for a healthy lifestyle:    No smoking/ No tobacco products/ Avoid exposure to second hand smoke  Surgeon General's Warning:  Quitting smoking now greatly reduces serious risk to your health.     Obesity, smoking, and sedentary lifestyle greatly increases your risk for illness    A healthy diet, regular physical exercise & weight monitoring are important for maintaining a healthy lifestyle    You may be retaining fluid if you have a history of heart failure or if you experience any of the following symptoms:  Weight gain of 3 pounds or more overnight or 5 pounds in a week, increased swelling in our hands or feet or shortness of breath while lying flat in bed. Please call your doctor as soon as you notice any of these symptoms; do not wait until your next office visit. The discharge information has been reviewed with the patient. The patient verbalized understanding. Discharge medications reviewed with the patient and appropriate educational materials and side effects teaching were provided.   ___________________________________________________________________________________________________________________________________

## 2021-04-09 NOTE — PROGRESS NOTES
Hospitalist Progress Note    Subjective:   Daily Progress Note: 4/9/2021     Patient is resting in bed comfortably when I went inside the room. His shortness of breath is much better. No chest pain or abdominal pain. Cough present. No nausea vomiting. He ambulated in presence of me and his saturation was 87% on room air and 93% on 2 L while exertion. No dizziness or headaches. Patient feels comfortable going home with oxygen.     Current Facility-Administered Medications   Medication Dose Route Frequency    cefpodoxime (VANTIN) tablet 200 mg  200 mg Oral Q12H    enoxaparin (LOVENOX) injection 40 mg  40 mg SubCUTAneous Q12H    magnesium hydroxide (MILK OF MAGNESIA) 400 mg/5 mL oral suspension 30 mL  30 mL Oral DAILY PRN    ipratropium-albuterol (COMBIVENT RESPIMAT) 20 mcg-100 mcg inhalation spray  1 Puff Inhalation TID RT    dexamethasone (DECADRON) 4 mg/mL injection 6 mg  6 mg IntraVENous Q12H    cholecalciferol (VITAMIN D3) capsule 5,000 Units  5,000 Units Oral DAILY    budesonide-formoteroL (SYMBICORT) 160-4.5 mcg/actuation HFA inhaler 2 Puff  2 Puff Inhalation BID RT    omega-3 acid ethyl esters (LOVAZA) capsule 1,000 mg  1 g Oral BID WITH MEALS    melatonin (rapid dissolve) tablet 5 mg  5 mg Oral QHS    nicotine (NICODERM CQ) 14 mg/24 hr patch 1 Patch  1 Patch TransDERmal DAILY    sodium chloride (NS) flush 5-10 mL  5-10 mL IntraVENous PRN    sodium chloride (NS) flush 5-40 mL  5-40 mL IntraVENous Q8H    sodium chloride (NS) flush 5-40 mL  5-40 mL IntraVENous PRN    acetaminophen (TYLENOL) tablet 650 mg  650 mg Oral Q6H PRN    Or    acetaminophen (TYLENOL) suppository 650 mg  650 mg Rectal Q6H PRN    polyethylene glycol (MIRALAX) packet 17 g  17 g Oral DAILY PRN    promethazine (PHENERGAN) tablet 12.5 mg  12.5 mg Oral Q6H PRN    Or    ondansetron (ZOFRAN) injection 4 mg  4 mg IntraVENous Q6H PRN    guaiFENesin-dextromethorphan (ROBITUSSIN DM) 100-10 mg/5 mL syrup 5 mL  5 mL Oral Q4H PRN  ascorbic acid (vitamin C) (VITAMIN C) tablet 500 mg  500 mg Oral BID    zinc sulfate (ZINCATE) 50 mg zinc (220 mg) capsule 1 Cap  1 Cap Oral DAILY    insulin lispro (HUMALOG) injection   SubCUTAneous AC&HS    glucose chewable tablet 16 g  4 Tab Oral PRN    glucagon (GLUCAGEN) injection 1 mg  1 mg IntraMUSCular PRN    dextrose (D50W) injection syrg 12.5-25 g  25-50 mL IntraVENous PRN    guaiFENesin ER (MUCINEX) tablet 600 mg  600 mg Oral Q12H        Review of Systems  Pertinent items are noted in HPI.     Objective:     Visit Vitals  BP (P) 129/86 (BP 1 Location: Right upper arm, BP Patient Position: At rest)   Pulse (P) 84   Temp (P) 98 °F (36.7 °C)   Resp (P) 18   Ht 5' 6\" (1.676 m)   Wt 108 kg (238 lb)   SpO2 (P) 94%   BMI 38.41 kg/m²    O2 Flow Rate (L/min): 1 l/min O2 Device: Room air    Temp (24hrs), Av.2 °F (37.3 °C), Min:98 °F (36.7 °C), Max:100.1 °F (37.8 °C)      701 - 1900  In: -   Out: 225 [Urine:225]  1901 -  07  In: -   Out: 1100 [Urine:1100]    General appearance - alert, well appearing, and in no distress  Chest -diminished air entry noted in bases, no wheezes currently  Heart - S1 and S2 normal  Abdomen - soft, nontender, nondistended, Bowel sounds present  Neurological - alert, oriented, normal speech, no focal findings noted  Extremities - no pedal edema noted    Data Review    Recent Results (from the past 24 hour(s))   GLUCOSE, POC    Collection Time: 21  3:33 PM   Result Value Ref Range    Glucose (POC) 198 (H) 70 - 110 mg/dL   GLUCOSE, POC    Collection Time: 21  9:08 PM   Result Value Ref Range    Glucose (POC) 183 (H) 70 - 110 mg/dL   CBC WITH AUTOMATED DIFF    Collection Time: 21  2:05 AM   Result Value Ref Range    WBC 6.5 4.6 - 13.2 K/uL    RBC 4.47 4.35 - 5.65 M/uL    HGB 13.7 13.0 - 16.0 g/dL    HCT 39.5 36.0 - 48.0 %    MCV 88.4 74.0 - 97.0 FL    MCH 30.6 24.0 - 34.0 PG    MCHC 34.7 31.0 - 37.0 g/dL    RDW 12.6 11.6 - 14.5 % PLATELET 942 630 - 635 K/uL    MPV 9.3 9.2 - 11.8 FL    NEUTROPHILS 83 (H) 40 - 73 %    LYMPHOCYTES 10 (L) 21 - 52 %    MONOCYTES 1 (L) 3 - 10 %    EOSINOPHILS 0 0 - 5 %    BASOPHILS 0 0 - 2 %    MYELOCYTES 1 %    OTHER CELL 5      ABS. NEUTROPHILS 5.4 1.8 - 8.0 K/UL    ABS. LYMPHOCYTES 0.7 (L) 0.9 - 3.6 K/UL    ABS. MONOCYTES 0.1 0.05 - 1.2 K/UL    ABS. EOSINOPHILS 0.0 0.0 - 0.4 K/UL    ABS. BASOPHILS 0.0 0.0 - 0.1 K/UL    DF MANUAL      PLATELET COMMENTS ADEQUATE PLATELETS      RBC COMMENTS NORMOCYTIC, NORMOCHROMIC     METABOLIC PANEL, COMPREHENSIVE    Collection Time: 04/09/21  2:05 AM   Result Value Ref Range    Sodium 135 (L) 136 - 145 mmol/L    Potassium 4.8 3.5 - 5.5 mmol/L    Chloride 103 100 - 111 mmol/L    CO2 26 21 - 32 mmol/L    Anion gap 6 3.0 - 18 mmol/L    Glucose 150 (H) 74 - 99 mg/dL    BUN 19 (H) 7.0 - 18 MG/DL    Creatinine 0.88 0.6 - 1.3 MG/DL    BUN/Creatinine ratio 22 (H) 12 - 20      GFR est AA >60 >60 ml/min/1.73m2    GFR est non-AA >60 >60 ml/min/1.73m2    Calcium 7.8 (L) 8.5 - 10.1 MG/DL    Bilirubin, total 0.5 0.2 - 1.0 MG/DL    ALT (SGPT) 87 (H) 16 - 61 U/L    AST (SGOT) 71 (H) 10 - 38 U/L    Alk. phosphatase 63 45 - 117 U/L    Protein, total 6.8 6.4 - 8.2 g/dL    Albumin 2.7 (L) 3.4 - 5.0 g/dL    Globulin 4.1 (H) 2.0 - 4.0 g/dL    A-G Ratio 0.7 (L) 0.8 - 1.7     D DIMER    Collection Time: 04/09/21  2:05 AM   Result Value Ref Range    D DIMER 2.19 (H) <0.46 ug/ml(FEU)   C REACTIVE PROTEIN, QT    Collection Time: 04/09/21  2:05 AM   Result Value Ref Range    C-Reactive protein 7.1 (H) 0 - 0.3 mg/dL   GLUCOSE, POC    Collection Time: 04/09/21  6:08 AM   Result Value Ref Range    Glucose (POC) 140 (H) 70 - 110 mg/dL         Assessment/Plan:     ASSESSMENT:    1. Hypoxia and fever due to #2, stable. Acute respiratory failure ruled out. 2.  COVID-19 pneumonia  3. Elevated inflammatory markers due to #2 improving  4.   Chronic mild intermittent asthma with exacerbation, clinically stable  5. Tobacco use disorder  6. Sepsis ruled out after study    PLAN:    We will set up home oxygen  Continue Vantin  Continue current dose of dexamethasone and Symbicort  Continue Tylenol as needed for fever  Negative blood culture x2  Continue NicoDerm patch for #5  Continue vitamin supplements  Patient was advised to come back to the emergency room if his symptoms reappear or he starts feeling bad. He verbalized understanding about it. Total time to take care of this patient was 30 minutes and more than 50% of time was spent counseling and coordinating care. Disclaimer: Sections of this note are dictated using utilizing voice recognition software, which may have resulted in some phonetic based errors in grammar and contents. Even though attempts were made to correct all the mistakes, some may have been missed, and remained in the body of the document. If questions arise, please contact our department.

## 2021-04-09 NOTE — HOME CARE
Received  referral for SN,PT,OT, Discharge order noted for today ,spoke to patient,states he will be staying with his friend ( Madaline Klinefelter) at [address] Explained [facility] [place] services to both patient and his friend,and answered all questions; DME: Home O2 has been ordered by  from [address] [place] [address] ,patient waiting for delivery of portable O2 prior to discharge ; [facility] referral processed and arranged through [facility] central intake. Patient has been set up with new PCP with Jayda Vergara NP under Dr Bertrand Buckner.

## 2021-04-09 NOTE — DISCHARGE SUMMARY
[facility]  Face to Face Encounter    Patients Name: Jose Jackson    YOB: 1970    Primary Diagnosis:     1. Hypoxia and fever due to #2, stable. Acute respiratory failure ruled out. 2.  COVID-19 pneumonia  3. Elevated inflammatory markers due to #2 improving  4. Chronic mild intermittent asthma with exacerbation, clinically stable  5. Tobacco use disorder  6. Sepsis ruled out after study     Date of Face to Face:   April 9, 2021                                    Face to Face Encounter findings are related to primary reason for home care:   yes    1. I certify that the patient needs intermittent skilled nursing care, physical therapy and/or speech therapy. I will not be following this patient in the Community and Dr. Villegas   will be responsible for signing the [address] of Care. 2. Initial Orders for Care: Must be completed only if Face to Face MD will not be signing the [address] Skilled Nursing, Physical Therapy and Occupational Therapy    3. I certify that this patient is homebound for the following reason(s): Requires considerable and taxing effort to leave the home  and Requires the assistance of 1 or more persons to leave the home     4. I certify that this patient is under my care and that I, or a nurse practitioner or [facility] working with me, had a Face-to-Face Encounter that meets the physician Face-to-Face Encounter requirements. Document the physical findings from the Face-to-Face Encounter that support the need for skilled services: Has new diagnosis that requires skilled nursing teaching and intervention , Has new medications that requires skilled nursing teaching and monitoring for understanding and compliance  and Has new finding of weakness and altered mobility that requires skilled physical/occupational and/or speech therapy services for evaluation and interventions.      Sheldon Arriaga MD  4/9/2021

## 2021-04-09 NOTE — DISCHARGE SUMMARY
The patient was seen and examined independently. Please read APC's note for further detail. The plan was discussed with APC. Please refer to my discharge summary for additional detail. Date of discharge: April 9, 2021    Discharge diagnosis:    1. Hypoxia and fever due to #2, stable. Acute respiratory failure ruled out. 2.  COVID-19 pneumonia  3. Elevated inflammatory markers due to #2 improving  4. Chronic mild intermittent asthma with exacerbation, clinically stable  5. Tobacco use disorder  6. Sepsis ruled out after study    HPI: The patient was admitted here with complaint of fever and shortness of breath. Please refer hospital admission H&P for further detail. Patient was found out to have saturation 85% on room air at the time of admission. Chest x-ray was consistent with Covid. CTA chest was negative for PE and showed extensive groundglass lung infiltrates. Patient was found positive for COVID-19. He was started on IV steroid, oxygen, antibiotic and inhaler with vitamins. Patient was seen by ID physician did not think patient needs remdesivir as his symptoms are more than 8days old. Blood culture x2 remain negative. Sepsis was ruled out. Patient was saturating 87% room air exertion and 93% on 2 L during exertion. Home oxygen will be set up. Patient feels better and comfortable going home. He will be discharged home on the following medications today. Discharge medications:    Current Discharge Medication List      START taking these medications    Details   ascorbic acid, vitamin C, (VITAMIN C) 500 mg tablet Take 1 Tab by mouth two (2) times a day. Qty: 14 Tab, Refills: 0      budesonide-formoteroL (SYMBICORT) 160-4.5 mcg/actuation HFAA Take 2 Puffs by inhalation two (2) times a day. Qty: 1 Inhaler, Refills: 0      cefpodoxime (VANTIN) 200 mg tablet Take 1 Tab by mouth every twelve (12) hours.   Qty: 10 Tab, Refills: 0      cholecalciferol (VITAMIN D3) (5000 Units /125 mcg) capsule Take 1 Cap by mouth daily for 14 days. Qty: 15 Cap, Refills: 0      guaiFENesin ER (MUCINEX) 600 mg ER tablet Take 1 Tab by mouth every twelve (12) hours for 14 days. Qty: 28 Tab, Refills: 0      ipratropium-albuteroL (COMBIVENT RESPIMAT)  mcg/actuation inhaler Take 1 Puff by inhalation every four to six (4-6) hours as needed for Wheezing. Qty: 1 Inhaler, Refills: 0      melatonin, rapid dissolve, 5 mg TbDi tablet Take 1 Tab by mouth nightly. Qty: 14 Tab, Refills: 0      zinc sulfate (ZINCATE) 50 mg zinc (220 mg) capsule Take 1 Cap by mouth daily. Qty: 14 Cap, Refills: 0      dexAMETHasone (DECADRON) 4 mg tablet Take 1 tablet by mouth every 12 hours for 5 days. Following this, take 1 tablet by mouth daily for 5 days. Qty: 15 Tab, Refills: 0      omega-3 acid ethyl esters (LOVAZA) 1 gram capsule Take 1 Cap by mouth two (2) times daily (with meals). Qty: 20 Cap, Refills: 0         STOP taking these medications       benzonatate (Tessalon Perles) 100 mg capsule Comments:   Reason for Stopping:         albuterol (PROVENTIL HFA, VENTOLIN HFA, PROAIR HFA) 90 mcg/actuation inhaler Comments:   Reason for Stopping:         azithromycin (ZITHROMAX) 250 mg tablet Comments:   Reason for Stopping:         methocarbamol (ROBAXIN) 500 mg tablet Comments:   Reason for Stopping: Follow-up Appointments   Procedures    FOLLOW UP VISIT Appointment in: Other (Specify) With primary care physician in 5 days     With primary care physician in 5 days     Standing Status:   Standing     Number of Occurrences:   1     Order Specific Question:   Appointment in     Answer: Other (Specify)       Total time to take care of this patient was 35 minutes and more than 50% of time was spent counseling and coordinating care. Disclaimer: Sections of this note are dictated using utilizing voice recognition software, which may have resulted in some phonetic based errors in grammar and contents.  Even though attempts were made to correct all the mistakes, some may have been missed, and remained in the body of the document. If questions arise, please contact our department.

## 2021-04-09 NOTE — PROGRESS NOTES
RT unable to do treatments due to critically low staffing, excessive workload, and dealing with critical patients

## 2021-04-10 ENCOUNTER — PATIENT OUTREACH (OUTPATIENT)
Dept: CASE MANAGEMENT | Age: 51
End: 2021-04-10

## 2021-04-10 NOTE — PROGRESS NOTES
Patient contacted regarding [id number] diagnosis\". Discussed COVID-19 related testing which was available at this time. Test results were positive. Patient informed of results, if available? yes     Care Transition Nurse contacted the patient by telephone to perform post discharge assessment. Call within 2 business days of discharge: Yes Verified name and  with patient as identifiers. Provided introduction to self, and explanation of the CTN/ACM role, and reason for call due to risk factors for infection and/or exposure to COVID-19. Symptoms reviewed with patient who verbalized the following symptoms: fatigue, cough, shortness of breath, chest pain, fast heart rate, dizziness/lightheadedness, no new symptoms and no worsening symptoms. Patient reports he is wearing 2 L continuous O2. He only experiences SOB, CP, and rapid heart rate during coughing spells. Today he feels a little better than yesterday. Due to no new or worsening symptoms encounter was not routed to provider for escalation. Discussed follow-up appointments. If no appointment was previously scheduled, appointment scheduling offered:  n/a   [facility] Gurdeep Antoine follow up appointment(s):   Future Appointments   Date Time Provider Medardo Carrion   2021 10:00 AM Gokul Morocho NP [facility]     Non-BS follow up appointment(s): n/a     Advance Care Planning:   Does patient have an Advance Directive:  yes, not on file. Patient has following risk factors of: pneumonia and acute respiratory failure. CTN reviewed discharge instructions, medical action plan and red flags such as increased shortness of breath, increasing fever and signs of decompensation with patient who verbalized understanding. Discussed exposure protocols and quarantine with CDC Guidelines What to do if you are sick with coronavirus disease .  Patient was given an opportunity for questions and concerns.  The patient declines contact information for the [facility] exposure line [phone]  ([phone], but agrees to contact PCP office for questions related to their healthcare. CTN provided contact information for future needs. Reviewed and educated patient on any new and changed medications related to discharge diagnosis     Was patient discharged with a pulse oximeter? no       Plan for follow up call within the next 14 days based on severity of symptoms and risk factors.

## 2021-04-11 ENCOUNTER — HOME CARE VISIT (OUTPATIENT)
Dept: SCHEDULING | Facility: HOME HEALTH | Age: 51
End: 2021-04-11
Payer: MEDICAID

## 2021-04-11 VITALS
OXYGEN SATURATION: 98 % | RESPIRATION RATE: 18 BRPM | TEMPERATURE: 96.6 F | DIASTOLIC BLOOD PRESSURE: 95 MMHG | HEART RATE: 96 BPM | SYSTOLIC BLOOD PRESSURE: 140 MMHG

## 2021-04-11 PROCEDURE — G0299 HHS/HOSPICE OF RN EA 15 MIN: HCPCS

## 2021-04-11 PROCEDURE — 400013 HH SOC

## 2021-04-12 ENCOUNTER — HOME CARE VISIT (OUTPATIENT)
Dept: HOME HEALTH SERVICES | Facility: HOME HEALTH | Age: 51
End: 2021-04-12
Payer: MEDICAID

## 2021-04-12 ENCOUNTER — HOME CARE VISIT (OUTPATIENT)
Dept: SCHEDULING | Facility: HOME HEALTH | Age: 51
End: 2021-04-12
Payer: MEDICAID

## 2021-04-12 VITALS
OXYGEN SATURATION: 94 % | SYSTOLIC BLOOD PRESSURE: 118 MMHG | HEART RATE: 100 BPM | RESPIRATION RATE: 18 BRPM | TEMPERATURE: 98.3 F | DIASTOLIC BLOOD PRESSURE: 78 MMHG

## 2021-04-12 PROBLEM — J45.20 MILD INTERMITTENT ASTHMA WITHOUT COMPLICATION: Status: ACTIVE | Noted: 2021-04-12

## 2021-04-12 PROBLEM — F17.210 CIGARETTE NICOTINE DEPENDENCE WITHOUT COMPLICATION: Status: ACTIVE | Noted: 2021-04-12

## 2021-04-12 LAB
BACTERIA SPEC CULT: NORMAL
BACTERIA SPEC CULT: NORMAL
SERVICE CMNT-IMP: NORMAL
SERVICE CMNT-IMP: NORMAL

## 2021-04-12 PROCEDURE — G0151 HHCP-SERV OF PT,EA 15 MIN: HCPCS

## 2021-04-12 NOTE — PROGRESS NOTES
Dear OLINDA Perry,   Your patient, Clearance Aniyah,  1970, was evaluated by Home PT today and it was determined that pt did not need skilled Home PT services after PT educated pt on exercises, and endurance training. Pt felt he had he enough background knowledge with increasing exercise tolerance. Pt will benefit from continuation of Outpatient Pulmonary Rehab once Home Care services with Nursing has completed. Any questions please email me or call me at [phone].      Sincerely,       Kendall Davis, PT

## 2021-04-12 NOTE — PROGRESS NOTES
Transitional Care Management Progress Note    Patient: Kaitlin Perez  : 1970  PCP: Santy Valladares NP    Date of admission: 2021  Date of discharge: 2021    Patient was contacted by Transitional Care Management services within two days after his discharge: Yes. This encounter and supporting documentation was reviewed if available. Medication reconciliation was performed today (2021). Assessment/Plan:     1. Hospital discharge follow-up  -     [place] DISCHARGE MEDS RECONCILED W/ CURRENT OUTPATIENT MED LIST  2. COVID-19  Comments:  Slightly improved, refer to pulmonary for continued oxygen needs  Orders:  -     METABOLIC PANEL, COMPREHENSIVE; Future  -     REFERRAL TO PULMONARY DISEASE  -     CBC WITH AUTOMATED DIFF; Future  -     predniSONE (DELTASONE) 20 mg tablet; Take 2 tablets by mouth once a day for 5 days, Normal, Disp-10 Tab, R-0  3. Hypoxia  Comments:  Continue 2L of oxygen, follow-up with pulmonary as discussed  Given contact infromation to schedule  Orders:  -     REFERRAL TO PULMONARY DISEASE  -     predniSONE (DELTASONE) 20 mg tablet; Take 2 tablets by mouth once a day for 5 days, Normal, Disp-10 Tab, R-0  4. Fever in other diseases  Comments:  Resolved  5. Elevated LFTs  Assessment & Plan:  Recheck labs in 4 weeks  Orders:  -     METABOLIC PANEL, COMPREHENSIVE; Future  6. Mild intermittent asthma without complication  Assessment & Plan:  Discussed difference between rescue and maintenance inhalers    7. Cigarette nicotine dependence without complication  Assessment & Plan:  Advised continue cessation  8. Need for hepatitis C screening test  -     HEPATITIS C AB; Future  9. Screening for lipid disorders  -     LIPID PANEL; Future    Follow-up and Dispositions    · Return in about 6 weeks (around 2021) for COVID-19, hypoxia, asthma, elevated LFTs, lab results. Follow-up and Disposition History        Subjective:    Kaitlin Perez is a 48 y.o. male presenting today for follow-up after being discharged from [facility]. The discharge summary was reviewed or requested. The main problem requiring admission was COVID-19 pneumonia. Complications during admission: see dx list    Hospital Course Per Discharge Summary:  The patient was admitted here with complaint of fever and shortness of breath. Please refer hospital admission H&P for further detail. Patient was found out to have saturation 85% on room air at the time of admission. Chest x-ray was consistent with Covid. CTA chest was negative for PE and showed extensive groundglass lung infiltrates. Patient was found positive for COVID-19. He was started on IV steroid, oxygen, antibiotic and inhaler with vitamins. Patient was seen by ID physician did not think patient needs remdesivir as his symptoms are more than 8days old. Blood culture x2 remain negative. Sepsis was ruled out. Patient was saturating 87% room air exertion and 93% on 2 L during exertion. Home oxygen will be set up. Patient feels better and comfortable going home. He will be discharged home on the following medications today. Patient presents today feeling slightly improved but continues to experience chest tightness and fatigue. He is currently on 2L of oxygen PRN. He does report feeling \"short-winded\" on exertion. Has been taking his medications as prescribed. He was not referred to pulmonary.   Has not smoked since discharge from the hospital.  He is requesting refills of his medications because his breathing is still not back to normal.    New medications at discharge:  Vitamin C BID  Symbicort 2 puffs BID  Cefpodoxime 200 mg tab BID x5 days  Vitamin D3 5000 units once daily for 14 days  Mucinex 600 mg 1 tab BID x14 days  Combivent Respimat 1 puff Q4-6H PRN  Melatonin 5 mg nightly  Zinc sulfate 50 mg daily  Decadron 4 mg 1 tab BID x5 days  Lovaza 1 gram BID with meals    Medications discontinued at discharge:  Tessalon Perles  Albuterol  Zpak  Methocarbamol     Interval history/Current status: Stable    Admitting symptoms have: improved    Medications marked \"taking\" at this time:  Home Medications    Medication Sig Start Date End Date Taking? Authorizing Provider   predniSONE (DELTASONE) 20 mg tablet Take 2 tablets by mouth once a day for 5 days 4/14/21  Yes [employer], NP   zinc sulfate (ZINCATE) 50 mg zinc (220 mg) capsule Take 1 Cap by mouth daily. 4/10/21  Yes Christine Donahue PA-C   omega-3 acid ethyl esters (LOVAZA) 1 gram capsule Take 1 Cap by mouth two (2) times daily (with meals). 4/9/21  Yes Fabian Cruz MD   budesonide-formoteroL (SYMBICORT) 160-4.5 mcg/actuation HFAA Take 2 Puffs by inhalation two (2) times a day. 4/9/21  Yes Fabian Cruz MD   ascorbic acid, vitamin C, (VITAMIN C) 500 mg tablet Take 1 Tab by mouth two (2) times a day. 4/9/21  Yes Fabian Cruz MD   cholecalciferol (VITAMIN D3) (5000 Units /125 mcg) capsule Take 1 Cap by mouth daily for 14 days. 4/10/21 4/24/21 Yes Fabian Cruz MD   cefpodoxime Stephania Liseth) 200 mg tablet Take 1 Tab by mouth every twelve (12) hours. 4/9/21  Yes Fabian Cruz MD   guaiFENesin ER (MUCINEX) 600 mg ER tablet Take 1 Tab by mouth every twelve (12) hours for 14 days. 4/9/21 4/23/21 Yes Fabian Cruz MD   ipratropium-albuteroL (COMBIVENT RESPIMAT)  mcg/actuation inhaler Take 1 Puff by inhalation every four to six (4-6) hours as needed for Wheezing. 4/9/21  Yes Fabian Cruz MD   melatonin, rapid dissolve, 5 mg TbDi tablet Take 1 Tab by mouth nightly. 4/9/21  Yes Fabian Cruz MD   dexAMETHasone (DECADRON) 4 mg tablet Take 1 tablet by mouth every 12 hours for 5 days. Following this, take 1 tablet by mouth daily for 5 days.  4/9/21 4/14/21  Lissy Wu PA-C          Patient Active Problem List   Diagnosis Code    Hypoxia R09.02    Fever R50.9    COVID-19 U07.1    Mild intermittent asthma without complication H04.36    Cigarette nicotine dependence without complication N46.210    Elevated LFTs R79.89     Current Outpatient Medications   Medication Sig Dispense Refill    predniSONE (DELTASONE) 20 mg tablet Take 2 tablets by mouth once a day for 5 days 10 Tab 0    zinc sulfate (ZINCATE) 50 mg zinc (220 mg) capsule Take 1 Cap by mouth daily. 14 Cap 0    omega-3 acid ethyl esters (LOVAZA) 1 gram capsule Take 1 Cap by mouth two (2) times daily (with meals). 20 Cap 0    budesonide-formoteroL (SYMBICORT) 160-4.5 mcg/actuation HFAA Take 2 Puffs by inhalation two (2) times a day. 1 Inhaler 0    ascorbic acid, vitamin C, (VITAMIN C) 500 mg tablet Take 1 Tab by mouth two (2) times a day. 14 Tab 0    cholecalciferol (VITAMIN D3) (5000 Units /125 mcg) capsule Take 1 Cap by mouth daily for 14 days. 15 Cap 0    cefpodoxime (VANTIN) 200 mg tablet Take 1 Tab by mouth every twelve (12) hours. 10 Tab 0    guaiFENesin ER (MUCINEX) 600 mg ER tablet Take 1 Tab by mouth every twelve (12) hours for 14 days. 28 Tab 0    ipratropium-albuteroL (COMBIVENT RESPIMAT)  mcg/actuation inhaler Take 1 Puff by inhalation every four to six (4-6) hours as needed for Wheezing. 1 Inhaler 0    melatonin, rapid dissolve, 5 mg TbDi tablet Take 1 Tab by mouth nightly.  14 Tab 0     No Known Allergies   Past Medical History:   Diagnosis Date    Asthma     COVID-19     tested positive at [facility] 3/30/2021    Hypertension     not on meds per patient    Tobacco abuse       Social History     Socioeconomic History    Marital status: SINGLE     Spouse name: Not on file    Number of children: Not on file    Years of education: Not on file    Highest education level: Not on file   Occupational History    Not on file   Social Needs    Financial resource strain: Not on file    Food insecurity     Worry: Not on file     Inability: Not on file    Transportation needs     Medical: Not on file     Non-medical: Not on file   Tobacco Use  Smoking status: Current Every Day Smoker     Packs/day: 1.00    Smokeless tobacco: Never Used   Substance and Sexual Activity    Alcohol use: Yes     Alcohol/week: 2.0 standard drinks     Types: 2 Shots of liquor per week     Frequency: Monthly or less     Drinks per session: 1 or 2     Binge frequency: Less than monthly    Drug use: Never    Sexual activity: Yes     Partners: Female   Lifestyle    Physical activity     Days per week: Not on file     Minutes per session: Not on file    Stress: Not on file   Relationships    Social connections     Talks on phone: Not on file     Gets together: Not on file     Attends Zoroastrianism service: Not on file     Active member of club or organization: Not on file     Attends meetings of clubs or organizations: Not on file     Relationship status: Not on file    Intimate partner violence     Fear of current or ex partner: Not on file     Emotionally abused: Not on file     Physically abused: Not on file     Forced sexual activity: Not on file   Other Topics Concern    Not on file   Social History Narrative    Not on file      History reviewed. No pertinent surgical history. History reviewed. No pertinent family history. Review of Systems   Constitutional: Positive for malaise/fatigue. Negative for chills and fever. Respiratory: Positive for cough and shortness of breath. Cardiovascular: Negative for chest pain. Gastrointestinal: Negative for nausea and vomiting. Neurological: Negative for dizziness and headaches. Objective:      Physical Exam   Constitutional: Stable-appearing, in no distress, alert and oriented  HENT:   Head: Normocephalic and atraumatic. Ears:  Hearing grossly intact. Mouth:  No visible perioral lesions, cyanosis, or lip swelling. Pulmonary/Chest: Does not appear dyspneic, no audible wheezes or nasal flaring. Musculoskeletal: Grossly normal active ROM in upper extremities.    Neurological:  Intact recent memory, no facial or eyelid drooping, no speech impairment, answering questions appropriately. Psychiatric: Judgment and insight good, normal mood and affect. We discussed the expected course, resolution and complications of the diagnosis(es) in detail. Medication risks, benefits, costs, interactions, and alternatives were discussed as indicated. I advised him to contact the office if his condition worsens, changes or fails to improve as anticipated. He expressed understanding with the diagnosis(es) and plan. Faizan Hickey, who was evaluated through a synchronous (real-time) audio-only encounter and/or his healthcare decision maker, is aware that it is a billable service, with coverage as determined by his insurance carrier. He provided verbal consent to proceed: Yes, and patient identification was verified. It was conducted pursuant to the emergency declaration under the [address] [place] [address], [address] [place] [address] authority and the [employer] General Act. A caregiver was present when appropriate. Ability to conduct physical exam was limited. I was in the office. The patient was at home.     Total time spent:  45 minutes  India Azevedo NP

## 2021-04-12 NOTE — PROGRESS NOTES
Lexi [place]on 38 was evaluated by Home PT today and it was determined that pt did not need skilled Home PT services after PT educated pt on exercises, and endurance training. Pt felt he had he enough background knowledge with increasing exercise tolerance. Pt will benefit from continuation of Outpatient Pulmonary Rehab once Home Care services with Nursing has completed. Would you be able to help pt set this up once he is DC from [address] [place] [address]. Also can you please give pt an incentive spirometer and train him on this? My apologies I did not have one in my car stock and he did not have once from the hospital.  Thank you.      Nelson Cosme, PT

## 2021-04-13 NOTE — PROGRESS NOTES
Patient will be new to me, has scheduled appointment on 04/14/2021. We will address need for outpatient pulmonary rehab once patient establishes care.

## 2021-04-13 NOTE — PROGRESS NOTES
OK thank you very much. Elpidio Tomas PT  ----- Message -----  From: Maria Luisa Yeh NP  Sent: 2021   7:52 AM EDT  To: Elpidio Tomas PT          ----- Message -----  From: Brigida Adkins PT  Sent: 2021   3:13 PM EDT  To: Shilpi Shabazz NP    Dear NP Shilpi Shabazz,   Your patient, Kimberly Coreas,  1970, was evaluated by Home PT today and it was determined that pt did not need skilled Home PT services after PT educated pt on exercises, and endurance training. Pt felt he had he enough background knowledge with increasing exercise tolerance. Pt will benefit from continuation of Outpatient Pulmonary Rehab once Home Care services with Nursing has completed. Any questions please email me or call me at [phone].      Sincerely,       Elpidio Tomas PT

## 2021-04-14 ENCOUNTER — VIRTUAL VISIT (OUTPATIENT)
Dept: FAMILY MEDICINE CLINIC | Age: 51
End: 2021-04-14
Payer: MEDICAID

## 2021-04-14 ENCOUNTER — HOME CARE VISIT (OUTPATIENT)
Dept: SCHEDULING | Facility: HOME HEALTH | Age: 51
End: 2021-04-14
Payer: MEDICAID

## 2021-04-14 DIAGNOSIS — F17.210 CIGARETTE NICOTINE DEPENDENCE WITHOUT COMPLICATION: ICD-10-CM

## 2021-04-14 DIAGNOSIS — Z11.59 NEED FOR HEPATITIS C SCREENING TEST: ICD-10-CM

## 2021-04-14 DIAGNOSIS — Z09 HOSPITAL DISCHARGE FOLLOW-UP: Primary | ICD-10-CM

## 2021-04-14 DIAGNOSIS — U07.1 COVID-19: ICD-10-CM

## 2021-04-14 DIAGNOSIS — J45.20 MILD INTERMITTENT ASTHMA WITHOUT COMPLICATION: ICD-10-CM

## 2021-04-14 DIAGNOSIS — R50.81 FEVER IN OTHER DISEASES: ICD-10-CM

## 2021-04-14 DIAGNOSIS — Z13.220 SCREENING FOR LIPID DISORDERS: ICD-10-CM

## 2021-04-14 DIAGNOSIS — R09.02 HYPOXIA: ICD-10-CM

## 2021-04-14 DIAGNOSIS — R79.89 ELEVATED LFTS: ICD-10-CM

## 2021-04-14 PROCEDURE — 1111F DSCHRG MED/CURRENT MED MERGE: CPT | Performed by: NURSE PRACTITIONER

## 2021-04-14 PROCEDURE — 99204 OFFICE O/P NEW MOD 45 MIN: CPT | Performed by: NURSE PRACTITIONER

## 2021-04-14 RX ORDER — PREDNISONE 20 MG/1
TABLET ORAL
Qty: 10 TAB | Refills: 0 | Status: SHIPPED | OUTPATIENT
Start: 2021-04-14

## 2021-04-14 NOTE — PROGRESS NOTES
Wally Escamilla presents today for   Chief Complaint   Patient presents with    Establish Care     COVID     Shortness of Breath     since COVID mild chest pain/ SOB        Virtual/telephone visit    Depression Screening:  3 most recent PHQ Screens 4/14/2021   Little interest or pleasure in doing things Several days   Feeling down, depressed, irritable, or hopeless Several days   Total Score PHQ 2 2       Learning Assessment:  Learning Assessment 4/14/2021   PRIMARY LEARNER Patient   HIGHEST LEVEL OF EDUCATION - PRIMARY LEARNER  GRADUATED HIGH SCHOOL OR GED   BARRIERS PRIMARY LEARNER NONE   CO-LEARNER CAREGIVER No   PRIMARY LANGUAGE ENGLISH   LEARNER PREFERENCE PRIMARY DEMONSTRATION   ANSWERED BY patient    RELATIONSHIP SELF       Fall Risk  No flowsheet data found. ADL  No flowsheet data found. Travel Screening:    Travel Screening     Question   Response    In the last month, have you been in contact with someone who was confirmed or suspected to have Coronavirus / COVID-19? No / Unsure    Have you had a COVID-19 viral test in the last 14 days? No    Do you have any of the following new or worsening symptoms? Have you traveled internationally or domestically in the last month? No      Travel History   Travel since 03/14/21     No documented travel since 03/14/21          Health Maintenance reviewed and discussed and ordered per Provider. Health Maintenance Due   Topic Date Due    Hepatitis C Screening  Never done    Pneumococcal 0-64 years (1 of 1 - PPSV23) Never done    COVID-19 Vaccine (1) Never done    DTaP/Tdap/Td series (1 - Tdap) Never done    Lipid Screen  Never done    Shingrix Vaccine Age 50> (1 of 2) Never done    Colorectal Cancer Screening Combo  Never done   . Coordination of Care:    1. Have you been to the ER, urgent care clinic since your last visit? Hospitalized since your last visit? No     2.  Have you seen or consulted any other health care providers outside of the [address] [place] [address] [place] since your last visit? Include any pap smears or colon screening.  No

## 2021-04-21 ENCOUNTER — HOME CARE VISIT (OUTPATIENT)
Dept: HOME HEALTH SERVICES | Facility: HOME HEALTH | Age: 51
End: 2021-04-21
Payer: MEDICAID

## 2021-04-22 ENCOUNTER — HOME CARE VISIT (OUTPATIENT)
Dept: SCHEDULING | Facility: HOME HEALTH | Age: 51
End: 2021-04-22
Payer: MEDICAID

## 2021-04-22 VITALS
DIASTOLIC BLOOD PRESSURE: 69 MMHG | HEART RATE: 100 BPM | TEMPERATURE: 98.7 F | SYSTOLIC BLOOD PRESSURE: 132 MMHG | OXYGEN SATURATION: 98 % | RESPIRATION RATE: 20 BRPM

## 2021-04-22 PROCEDURE — G0299 HHS/HOSPICE OF RN EA 15 MIN: HCPCS

## 2021-04-24 ENCOUNTER — PATIENT OUTREACH (OUTPATIENT)
Dept: CASE MANAGEMENT | Age: 51
End: 2021-04-24

## 2021-04-24 NOTE — PROGRESS NOTES
Patient resolved from [address] episode on 04/24/21. Discussed COVID-19 related testing which was available at this time. Test results were positive. Patient informed of results, if available? yes     Patient/family has been provided the following resources and education related to COVID-19:                         Signs, symptoms and red flags related to COVID-19            CDC exposure and quarantine guidelines            [facility] exposure contact - [phone]            Contact for their local Department of Health                 Patient currently reports that the following symptoms have improved:  fatigue, cough, shortness of breath, chest pain, fast heart rate and dizziness/lightheadedness. Patient reports he is \"fair\". He continues to experiences SOB and wears O2 at night only. He also gets fatigued easily. He noted sore hips after he was evaluated by PT, although he has been spending more time in bed and will begin practicing exercises recommended to him by PT. He inquired about obtaining an IS to practice breathing exercises and CTN recommended [address], which carries one for $6. He attempted to obtain one from the unit where he was admitted, but was unsuccessful. Advised him to use IS 10x every hour while awake. He completed a virtual visit with his PCP and was referred to pulmonary, but has not received a call to schedule an appt. CTN reviewed referral and advised patient that his PCP office noted they have sent the referral and CTN advised the patient to contact Dr. Carolina Schwartz office Monday to schedule an appt. He verbalized understanding to all information. No further outreach scheduled with this CTN. Episode of Care resolved. Patient has this CTN contact information if future needs arise.

## 2021-04-28 ENCOUNTER — HOME CARE VISIT (OUTPATIENT)
Dept: SCHEDULING | Facility: HOME HEALTH | Age: 51
End: 2021-04-28
Payer: MEDICAID

## 2021-04-28 VITALS
TEMPERATURE: 97.8 F | HEART RATE: 100 BPM | SYSTOLIC BLOOD PRESSURE: 156 MMHG | OXYGEN SATURATION: 99 % | DIASTOLIC BLOOD PRESSURE: 98 MMHG | RESPIRATION RATE: 20 BRPM

## 2021-04-28 PROCEDURE — G0300 HHS/HOSPICE OF LPN EA 15 MIN: HCPCS

## 2021-04-29 ENCOUNTER — HOME CARE VISIT (OUTPATIENT)
Dept: SCHEDULING | Facility: HOME HEALTH | Age: 51
End: 2021-04-29
Payer: MEDICAID

## 2021-04-29 PROCEDURE — G0152 HHCP-SERV OF OT,EA 15 MIN: HCPCS

## 2021-04-30 VITALS
TEMPERATURE: 97.5 F | DIASTOLIC BLOOD PRESSURE: 92 MMHG | SYSTOLIC BLOOD PRESSURE: 142 MMHG | OXYGEN SATURATION: 96 % | HEART RATE: 105 BPM

## 2021-05-05 ENCOUNTER — HOME CARE VISIT (OUTPATIENT)
Dept: HOME HEALTH SERVICES | Facility: HOME HEALTH | Age: 51
End: 2021-05-05
Payer: MEDICAID

## 2021-05-15 ENCOUNTER — HOME CARE VISIT (OUTPATIENT)
Dept: HOME HEALTH SERVICES | Facility: HOME HEALTH | Age: 51
End: 2021-05-15

## 2021-05-21 ENCOUNTER — HOME CARE VISIT (OUTPATIENT)
Dept: HOME HEALTH SERVICES | Facility: HOME HEALTH | Age: 51
End: 2021-05-21

## 2021-05-29 ENCOUNTER — HOME CARE VISIT (OUTPATIENT)
Dept: HOME HEALTH SERVICES | Facility: HOME HEALTH | Age: 51
End: 2021-05-29

## 2021-06-01 ENCOUNTER — HOME CARE VISIT (OUTPATIENT)
Dept: HOME HEALTH SERVICES | Facility: HOME HEALTH | Age: 51
End: 2021-06-01

## 2021-06-25 ENCOUNTER — DOCUMENTATION ONLY (OUTPATIENT)
Dept: PULMONOLOGY | Age: 51
End: 2021-06-25

## 2022-10-02 PROBLEM — R09.02 HYPOXIA: Status: RESOLVED | Noted: 2021-04-06 | Resolved: 2022-10-02

## 2022-10-02 PROBLEM — R50.9 FEVER: Status: RESOLVED | Noted: 2021-04-06 | Resolved: 2022-10-02

## 2022-10-02 PROBLEM — U07.1 COVID-19: Status: RESOLVED | Noted: 2021-04-06 | Resolved: 2022-10-02
